# Patient Record
Sex: MALE | Race: WHITE
[De-identification: names, ages, dates, MRNs, and addresses within clinical notes are randomized per-mention and may not be internally consistent; named-entity substitution may affect disease eponyms.]

---

## 2018-07-02 ENCOUNTER — HOSPITAL ENCOUNTER (EMERGENCY)
Dept: HOSPITAL 75 - ER | Age: 73
LOS: 1 days | Discharge: HOME | End: 2018-07-03
Payer: MEDICARE

## 2018-07-02 VITALS — HEIGHT: 70 IN | BODY MASS INDEX: 30.35 KG/M2 | WEIGHT: 212 LBS

## 2018-07-02 DIAGNOSIS — K21.9: ICD-10-CM

## 2018-07-02 DIAGNOSIS — I10: ICD-10-CM

## 2018-07-02 DIAGNOSIS — Z87.891: ICD-10-CM

## 2018-07-02 DIAGNOSIS — Z85.038: ICD-10-CM

## 2018-07-02 DIAGNOSIS — Z79.52: ICD-10-CM

## 2018-07-02 DIAGNOSIS — M54.5: Primary | ICD-10-CM

## 2018-07-02 DIAGNOSIS — Z90.49: ICD-10-CM

## 2018-07-02 PROCEDURE — 72131 CT LUMBAR SPINE W/O DYE: CPT

## 2018-07-02 PROCEDURE — 96372 THER/PROPH/DIAG INJ SC/IM: CPT

## 2018-07-02 NOTE — XMS REPORT
[facility]

 Created on: 10/13/2016



Eddi Sesay

External Reference #: [id number]

: 1945

Sex: Male



Demographics







 Address  [address] [place]  [address]

 

 Home Phone  [phone]

 

 Preferred Language  Unknown

 

 Marital Status  Unknown

 

 Zoroastrian Affiliation  Unknown

 

 Race  White

 

 Ethnic Group  Not  or 





Author







 Author  MALDONADO TOMPKINS

 

 [facility]  eClinicalWorks

 

 Address  Unknown

 

 Phone  Unavailable







Care Team Providers







 Care Team Member Name  Role  Phone

 

 MALDONADO TOMPKINS  CP  Unavailable



                                                                



Allergies

          No Known Allergies                                                   
                                     



Problems

          





 Problem Type  Condition  Code  Onset Dates  Condition Status

 

 Problem  Hypertension  I10     Active

 

 Problem  Hyperlipidemia  E78.5     Active

 

 Problem  Diabetes  E11.9     Active

 

 Problem  Chronic kidney disease  N18.9     Active

 

 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 
lower urinary tract symptoms [LUTS]  600.01     Active

 

 Problem  COPD (chronic obstructive pulmonary disease)  J44.9     Active

 

 Problem  Altered metabolism due to diabetes  E11.9     Active



                                                                               
                                                                     



Medications

          





 Medication  Code System  Code  Instructions  Start Date  End Date  Status  
Dosage

 

 Finasteride  NDC  29991421032  5MG Orally Once a day           1 tablet



                                                                              



Results

          No Known Results                                                     
               



Summary Purpose

          eClinicalWorks Submission

## 2018-07-02 NOTE — XMS REPORT
[facility]

 Created on: 2015



Eddi Sesay

External Reference #: [id number]

: 1945

Sex: Male



Demographics







 Address  [address] [place]  [address]

 

 Home Phone  [phone]

 

 Preferred Language  Unknown

 

 Marital Status  Unknown

 

 Amish Affiliation  Unknown

 

 Race  White

 

 Ethnic Group  Not  or 





Author







 MALDONADO Catalan

 

 Organization  eClinical[employer]

 

 Address  Unknown

 

 Phone  Unavailable







Care Team Providers







 Care Team Member Name  Role  Phone

 

 MALDONADO TOMPKINS  CP  Unavailable



                                                                



Allergies, Adverse Reactions, Alerts

          





 Substance  Reaction  Event Type

 

 Morphine  Info Not Available  Drug Allergy



                                                                               
         



Problems

          





 Problem Type  Condition  ICD-9 Code  Onset Dates  Condition Status

 

 Problem  Other abnormal glucose  790.29     Active

 

 Assessment  Diabetes mellitus without mention of complication, type II or 
unspecified type, not stated as uncontrolled  250.00     Active

 

 Problem  Diabetes mellitus without mention of complication, type II or 
unspecified type, not stated as uncontrolled  250.00     Active

 

 Problem  Chronic airway obstruction, not elsewhere classified  496     Active

 

 Problem  Cervicalgia  723.1     Active

 

 Problem  Shortness of breath  786.05     Active

 

 Problem  Unspecified prostatitis  601.9     Active

 

 Problem  Diabetes mellitus without mention of complication, type II or 
unspecified type, uncontrolled  250.02     Active

 

 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 
lower urinary tract symptoms [LUTS]  600.01     Active



                                                                               
                                                                               
          



Medications

          





 Medication  Code System  Code  Instructions  Start Date  End Date  Status  
Dosage

 

 Metformin HCl  NDC  [address]-21  1000 MG Orally Twice a day           1 
tablet with meals

 

 GlipiZIDE  NDC  [address]-29  10 MG Orally            TAKE ONE TABLET BY 
MOUTH ONCE DAILY

 

 Lisinopril  NDC  92468341641  20MG             TAKE ONE TABLET BY MOUTH ONCE 
DAILY, NEED FOLLOW UP APPT

 

 Finasteride  NDC  [address]-83  5 MG Orally Once a day           1 tablet



                                                                               
                                       



Procedures

          





 Procedure  Coding System  Code  Date

 

 Office Visit, Est Pt., Level 3  CPT-4  95301  Aug 28, 2015

 

 [facility] VISIT ESTABLISHED PATIENT  CPT-4    Aug 28, 2015



                                                                               
                             



Vital Signs

          





 Date/Time:  Aug 28, 2015

 

 Temperature  98.0 F

 

 Weight  209 lbs

 

 Height  70 in

 

 BMI  29.99 Index

 

 Blood Pressure Diastolic  80 mmHg

 

 Blood Pressure Systolic  152 mmHg

 

 Cardiac Monitoring Heart Rate  80 bpm



                                                                              



Results

          No Known Results                                                     
               



Summary Purpose

          eClinicalWorks Submission

## 2018-07-02 NOTE — ED BACK PAIN
General


Chief Complaint:  Back Problems


Stated Complaint:  BACK PAIN


Nursing Triage Note:  


c/o back pain x 4 or 5 days


Nursing Sepsis Screen:  No Definite Risk


Source of Information:  Patient, Family


Exam Limitations:  No Limitations





History of Present Illness


Date Seen by Provider:  Jul 2, 2018


Time Seen by Provider:  23:29


Initial Comments


Patient presents to the ER with his wife with chief complaint for a couple 

weeks now he's had some low back pain progressively getting worse. He does not 

have a history of a lot of back pain and he has not really taken anything but 

some Tylenol for it. He says he does not like taking pills. He would be willing 

to do a muscle relaxant however. He says in the past his primary care doctor 

has suggested he might need an MRI for his back but he has resisted doing any 

imaging. He does have a history of BPH and is on finasteride. He is having no 

problems with dysuria, hesitancy, however he does have some pain wrapping 

around to his groin from his back pain. No history of kidney stones. No 

hematuria. He has had extensive surgery on his belly secondary to a colorectal 

cancer removed with a partial colectomy, colostomy and reanastomosis over 10 

years ago. He's having no problems with bowel movements and had one today 

normal formed. No fevers chills nausea vomiting weakness, incontinence, 

hesitancy of urine, saddle anesthesia, or radiation of his pain.





Allergies and Home Medications


Allergies


Coded Allergies:  


     No Known Drug Allergies (Unverified , 4/14/11)





Home Medications


Prednisone 20 Mg Tab, 40 MG PO DAILY


   Prescribed by: ARLET BAPTISTE on 7/3/18 0031





Patient Home Medication List


Home Medication List Reviewed:  Yes





Constitutional:  No chills, No diaphoresis


EENTM:  No ear pain, No eye pain


Respiratory:  No cough, No short of breath


Cardiovascular:  No chest pain, No Hx of Intervention


Gastrointestinal:  No abdominal pain, No constipation, No diarrhea, No nausea


Genitourinary:  No discharge, No dysuria


Musculoskeletal:  see HPI, back pain


Skin:  No pruritus, No rash





Past Medical-Social-Family Hx


Patient Social History


Alcohol Use:  Denies Use


Recreational Drug Use:  No


Smoking Status:  Former Smoker


Recent Foreign Travel:  No


Contact w/Someone Who Travel:  No


Recent Infectious Disease Expo:  No


Recent Hopitalizations:  No





Past Medical History


Surgeries:  Yes (Colon resection)


Respiratory:  No


Cardiac:  Yes


Hypertension


Neurological:  No


Reproductive Disorders:  No


Genitourinary:  No


Gastrointestinal:  Yes


Gastroesophageal Reflux


Musculoskeletal:  No


Endocrine:  Yes


Diabetes, Non-Insulin dep


Cancer:  Yes


Colon


Psychosocial:  No


Integumentary:  No


Blood Disorders:  No





Physical Exam


Vital Signs





Vital Signs - First Documented








 7/2/18





 23:22


 


Temp 98.2


 


Pulse 80


 


Resp 18


 


B/P (MAP) 180/81 (114)


 


Pulse Ox 98





Capillary Refill : Less Than 3 Seconds


General Appearance:  WD/WN, Mild Distress


HEENT:  PERRL/EOMI, Pharynx Normal


Neck:  Full Range of Motion, Normal Inspection


Cardiovascular:  Regular Rate, Rhythm, No Edema, Normal Peripheral Pulses


Respiratory:  Chest Non Tender, Lungs Clear, No Accessory Muscle Use, No 

Respiratory Distress


Gastrointestinal:  Normal Bowel Sounds, Non Tender, Soft


Back:  Normal Inspection, No CVA Tenderness, Vertebral Tenderness (midline 

lumbar)


Extremity:  Normal Capillary Refill, Non Tender, No Calf Tenderness, No Pedal 

Edema


Neurologic/Psychiatric:  Alert, Oriented x3, No Motor/Sensory Deficits, Normal 

Mood/Affect





Progress/Results/Core Measures


Results/Orders


My Orders





Orders - ARLET BAPTISTE


Ketorolac Injection (Toradol Injection) (7/2/18 23:45)


Ct Lumbar Spine Wo (7/2/18 23:36)


Orphenadrine Injection (Norflex Injectio (7/2/18 23:45)





Medications Given in ED





Current Medications








 Medications  Dose


 Ordered  Sig/Sravani


 Route  Start Time


 Stop Time Status Last Admin


Dose Admin


 


 Orphenadrine


 Citrate  60 mg  ONCE  ONCE


 IM  7/2/18 23:45


 7/2/18 23:47 DC 7/3/18 00:17


60 MG








Vital Signs/I&O











 7/2/18





 23:22


 


Temp 98.2


 


Pulse 80


 


Resp 18


 


B/P (MAP) 180/81 (114)


 


Pulse Ox 98














Blood Pressure Mean:  114











Progress


Progress Note :  


   Time:  23:46


Progress Note


He has declined any kind of pain medicine but he says he would take a muscle 

relaxants or any give him Norflex. We discussed doing imaging now or following 

up with her PCP for MRI and he would prefer to do the CT scan tonight. We also 

discussed steroids and he'll be okay with some prednisone. He has diabetes but 

is not on short acting insulin. We have warned him that could increase his 

blood sugars and we'll put him on 40 mg daily for 5 days.





Diagnostic Imaging





   Diagonstic Imaging:  CT (no contrast)


   Plain Films/CT/US/NM/MRI:  other (lumbar spine)


Comments


No acute osseous abnormality. Mild loss of lumbar curvature. No fracture or 

subluxation. Soft tissues unremarkable.


No acute fracture or subluxation.


   Reviewed:  Reviewed [place] Study, Reviewed by Me





Departure


Impression





 Primary Impression:  


 Lumbago


 Qualified Codes:  M54.5 - Low back pain


Disposition:  01 HOME, SELF-CARE


Condition:  Stable





Departure-Patient Inst.


Decision time for Depature:  00:41


Referrals:  


MALDONADO TOMPKINS MD (PCP/Family)


Primary Care Physician


Patient Instructions:  Low Back Pain  (DC)





Add. Discharge Instructions:  








You can use an ice pack alternated with heating pads. You can use topical 

creams such as icy hot or Biofreeze. You can use Tylenol 1000 mg every 8 hours 

in addition to ibuprofen 800 mg every 8 hours. If you're having back spasms of 

the muscles of your back you can take one tablet of the cyclobenzaprine every 8 

hours as needed. If you still feel drowsy when you wake up then cut the tablet 

in half next time. You can go see a chiropractor. 


 the prednisone and take 2 tablets daily for the next 5 days. Prednisone 

may make your blood sugar rise you may have facial flushing, however and 

feeling of being wired or increased energy.


If you're still having a lot of back pain after a week or 2 you can follow-up 

with her primary care doctor and discussed potentially doing physical therapy. 

If you start to have incontinence of your bowel or bladder, weakness and falls, 

numbness then you should return to the ER for evaluation. 


All discharge instructions reviewed with patient and/or family. Voiced 

understanding.


Scripts


Prednisone (Prednisone) 20 Mg Tab


40 MG PO DAILY for 5 Days, #10 TAB 0 Refills


   Prov: ARLET BAPTISTE         7/3/18





Copy


Copies To 1:   ADÁN AGUDELO TITUS J Jul 2, 2018 23:36

## 2018-07-02 NOTE — XMS REPORT
[facility]

 Created on: 10/15/2015



Eddi Sesay

External Reference #: [id number]

: 1945

Sex: Male



Demographics







 Address  [address] [place]  [address]

 

 Home Phone  [phone]

 

 Preferred Language  Unknown

 

 Marital Status  Unknown

 

 Latter-day Affiliation  Unknown

 

 Race  White

 

 Ethnic Group  Not  or 





Author







 Author  MALDONADO TOMPKINS

 

 [facility]  eClinicalWorks

 

 Address  Unknown

 

 Phone  Unavailable







Care Team Providers







 Care Team Member Name  Role  Phone

 

 MALDONADO TOMPKINS    Unavailable



                                                                



Allergies

          No Known Allergies                                                   
                                     



Problems

          





 Problem Type  Condition  Code  Onset Dates  Condition Status

 

 Problem  Unspecified prostatitis  601.9     Active

 

 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 
lower urinary tract symptoms [LUTS]  600.01     Active

 

 Problem  Shortness of breath  786.05     Active

 

 Assessment  Altered metabolism due to diabetes  E11.9     Active

 

 Problem  Other abnormal glucose  790.29     Active

 

 Problem  Chronic kidney disease  N18.9     Active

 

 Problem  Other and unspecified hyperlipidemia  272.4     Active

 

 Problem  Altered metabolism due to diabetes  E11.9     Active

 

 Problem  Chronic airway obstruction, not elsewhere classified  496     Active

 

 Problem  Diabetes mellitus without mention of complication, type II or 
unspecified type, uncontrolled  250.02     Active

 

 Problem  Cervicalgia  723.1     Active

 

 Problem  Diabetes mellitus without mention of complication, type II or 
unspecified type, not stated as uncontrolled  250.00     Active



                                                                               
                                                                               
                                        



Medications

          





 Medication  Code System  Code  Instructions  Start Date  End Date  Status  
Dosage

 

 Jardiance  NDC  [address]-40  25 MG Orally Once a day  Oct 06, 2015        1 
tablet



                                                                              



Results

          No Known Results                                                     
               



Summary Purpose

          eClinicalWorks Submission

## 2018-07-02 NOTE — XMS REPORT
[facility]

 Created on: 2017



Eddi Sesay

External Reference #: [id number]

: 1945

Sex: Male



Demographics







 Address  [address] [place]  [address]

 

 Preferred Language  Unknown

 

 Marital Status  Unknown

 

 Latter day Affiliation  Unknown

 

 Race  Unknown

 

 Ethnic Group  Unknown





Author







 Author  MALDONADO TOMPKINS

 

 [facility]

 

 Address  [address] [place]  [address]



 

 Phone  [phone]







Care Team Providers







 Care Team Member Name  Role  Phone

 

 MALDONADO TOMPKINS  Unavailable  [phone]







PROBLEMS







 Type  Condition  ICD9-CM Code  CHV16-JB Code  Onset Dates  Condition Status  
SNOMED Code

 

 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 
lower urinary tract symptoms [LUTS]  600.01        Active  218176157

 

 Assessment  Hyperlipidemia     E78.5  28 Sep, 2016  Active  14409051

 

 Problem  Diabetes     E11.9     Active  44945202

 

 Problem  Hypertension     I10     Active  61749045

 

 Problem  Altered metabolism due to diabetes     E11.9     Active  92269601

 

 Problem  Chronic kidney disease     N18.9     Active  325040478

 

 Problem  Hyperlipidemia     E78.5     Active  10250756

 

 Problem  COPD (chronic obstructive pulmonary disease)     J44.9     Active  
20872308







ALLERGIES

Unknown Allergies



SOCIAL HISTORY

No smoking Hx information available



PLAN OF CARE





VITAL SIGNS





MEDICATIONS

Unknown Medications



RESULTS







 Name  Result  Date  Reference Range

 

 LIPID PANEL     2016   

 

 Cholesterol, Total  118     100-199

 

 Triglycerides  142     0-149

 

 HDL Cholesterol  25     >39

 

 VLDL Cholesterol Victor Manuel  28     5-40

 

 LDL Cholesterol Calc  65     0-99

 

 Comment:         

 

 CMP     2016   

 

 Glucose, Serum  180     65-99

 

 BUN  10     8-27

 

 Creatinine, Serum  1.05     0.76-1.27

 

 eGFR If NonAfricn Am  72         >59

 

 eGFR If Africn Am  83         >59

 

 BUN/Creatinine Ratio  10     10-22

 

 Sodium, Serum  139     134-144

 

 Potassium, Serum  4.8     3.5-5.2

 

 Chloride, Serum  97     

 

 Carbon Dioxide, Total  24     18-29

 

 Calcium, Serum  9.7     8.6-10.2

 

 Protein, Total, Serum  6.5     6.0-8.5

 

 Albumin, Serum  4.3     3.6-4.8

 

 Globulin, Total  2.2     1.5-4.5

 

 A/G Ratio  2.0     1.1-2.5

 

 Bilirubin, Total  0.7     0.0-1.2

 

 Alkaline Phosphatase, S  77     

 

 AST (SGOT)  25     0-40

 

 ALT (SGPT)  34     0-44







PROCEDURES







 Procedure  Date Ordered  Related Diagnosis  Body Site

 

 [facility]  [facility]      

 

 VENIPUNCT, ROUTINE*  2016      







IMMUNIZATIONS

No Known Immunizations

## 2018-07-02 NOTE — XMS REPORT
[facility]

 Created on: 10/09/2015



Eddi Sesay

External Reference #: [id number]

: 1945

Sex: Male



Demographics







 Address  [address] [place]  [address]

 

 Home Phone  [phone]

 

 Preferred Language  Unknown

 

 Marital Status  Unknown

 

 Mormonism Affiliation  Unknown

 

 Race  White

 

 Ethnic Group  Not  or 





Author







 Author  MALDONADO TOMPKINS

 

 [facility]  eClinicalWorks

 

 Address  Unknown

 

 Phone  Unavailable







Care Team Providers







 Care Team Member Name  Role  Phone

 

 MALDONADO TOMPKINS  CP  Unavailable



                                                                



Allergies

          No Known Allergies                                                   
                                     



Problems

          





 Problem Type  Condition  Code  Onset Dates  Condition Status

 

 Problem  Unspecified prostatitis  601.9     Active

 

 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 
lower urinary tract symptoms [LUTS]  600.01     Active

 

 Problem  Shortness of breath  786.05     Active

 

 Problem  Other abnormal glucose  790.29     Active

 

 Problem  Chronic kidney disease  N18.9     Active

 

 Problem  Other and unspecified hyperlipidemia  272.4     Active

 

 Problem  Altered metabolism due to diabetes  E11.9     Active

 

 Problem  Chronic airway obstruction, not elsewhere classified  496     Active

 

 Problem  Diabetes mellitus without mention of complication, type II or 
unspecified type, uncontrolled  250.02     Active

 

 Problem  Cervicalgia  723.1     Active

 

 Problem  Diabetes mellitus without mention of complication, type II or 
unspecified type, not stated as uncontrolled  250.00     Active



                                                                               
                                                                               
                              



Medications

          No Known Medications                                                 
                             



Results

          No Known Results                                                     
               



Summary Purpose

          [employer]AB [employer]AG[employer] Submission

## 2018-07-02 NOTE — XMS REPORT
[facility]

 Created on: 10/08/2015



Eddi Sesay

External Reference #: [id number]

: 1945

Sex: Male



Demographics







 Address  [address] [place]  [address]

 

 Home Phone  [phone]

 

 Preferred Language  Unknown

 

 Marital Status  Unknown

 

 Latter day Affiliation  Unknown

 

 Race  White

 

 Ethnic Group  Not  or 





Author







 Author  MALDONADO TOMPKINS

 

 [facility]  eClinicalWorks

 

 Address  Unknown

 

 Phone  Unavailable







Care Team Providers







 Care Team Member Name  Role  Phone

 

 MALDONADO TOMPKINS  CP  Unavailable



                                                                



Allergies

          No Known Allergies                                                   
                                     



Problems

          





 Problem Type  Condition  Code  Onset Dates  Condition Status

 

 Problem  Unspecified prostatitis  601.9     Active

 

 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 
lower urinary tract symptoms [LUTS]  600.01     Active

 

 Problem  Shortness of breath  786.05     Active

 

 Assessment  Chronic kidney disease  N18.9     Active

 

 Problem  Other abnormal glucose  790.29     Active

 

 Problem  Chronic kidney disease  N18.9     Active

 

 Problem  Other and unspecified hyperlipidemia  272.4     Active

 

 Problem  Altered metabolism due to diabetes  E11.9     Active

 

 Problem  Chronic airway obstruction, not elsewhere classified  496     Active

 

 Problem  Diabetes mellitus without mention of complication, type II or 
unspecified type, uncontrolled  250.02     Active

 

 Problem  Cervicalgia  723.1     Active

 

 Problem  Diabetes mellitus without mention of complication, type II or 
unspecified type, not stated as uncontrolled  250.00     Active



                                                                               
                                                                               
                                        



Medications

          No Known Medications                                                 
                             



Results

          No Known Results                                                     
               



Summary Purpose

          eClinicalWorks Submission

## 2018-07-02 NOTE — XMS REPORT
[facility]

 Created on: 2017



Eddi Sesay

External Reference #: [id number]

: 1945

Sex: Male



Demographics







 Address  [address] [place]  [address]

 

 Preferred Language  Unknown

 

 Marital Status  Unknown

 

 Mosque Affiliation  Unknown

 

 Race  Unknown

 

 Ethnic Group  Unknown





Author







 Author  MALDONADO TOMPKINS

 

 [facility]

 

 Address  [address] [place]  [address]



 

 Phone  [phone]







Care Team Providers







 Care Team Member Name  Role  Phone

 

 MALDONADO TOMPKINS  Unavailable  [phone]







PROBLEMS







 Type  Condition  ICD9-CM Code  KCA12-HC Code  Onset Dates  Condition Status  
SNOMED Code

 

 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 
lower urinary tract symptoms [LUTS]  600.01        Active  305839210

 

 Assessment  Diabetes     E11.9  27 Sep, 2016  Active  999566153

 

 Problem  Diabetes     E11.9     Active  51446605

 

 Problem  Hypertension     I10     Active  93700487

 

 Problem  Altered metabolism due to diabetes     E11.9     Active  10149967

 

 Problem  Chronic kidney disease     N18.9     Active  109611716

 

 Problem  Hyperlipidemia     E78.5     Active  65679969

 

 Problem  COPD (chronic obstructive pulmonary disease)     J44.9     Active  
95728495







ALLERGIES







 Substance  Reaction  Event Type  Date  Status

 

 Morphine  Unknown  Drug Allergy  27 Sep, 2016  Active







SOCIAL HISTORY

No smoking Hx information available



PLAN OF CARE





VITAL SIGNS







 Height  70 in  2016

 

 Weight  208.8 lbs  2016

 

 Heart Rate  80 bpm  2016

 

 Respiratory Rate  20   2016

 

 BMI  29.96 kg/m2  2016

 

 Blood pressure systolic  142 mmHg  2016

 

 Blood pressure diastolic  76 mmHg  2016







MEDICATIONS







 Medication  Instructions  Dosage  Frequency  Start Date  End Date  Duration  
Status

 

 Metformin HCl 1000MG  Orally Once a day  TAKE ONE TABLET BY MOUTH TWICE DAILY 
....  MUST  HAVE  FOLLOW  UP  APPT  FOR  FURTHER  REFILLS  24h           Active

 

 Lipitor 10 MG  Orally Once a day  1 tablet  24h  08 Sep, 2015        Active

 

 Trulicity 1.5 MG/0.5ML  Subcutaneous once weekly  0.5 ml     27 Sep, 2016     
   Active

 

 GlipiZIDE 5MG     TAKE ONE TABLET BY MOUTH ONCE DAILY  24h           Active

 

 Lisinopril 20MG     TAKE ONE TABLET BY MOUTH ONCE DAILY, NEED FOLLOW UP APPT  
            Active

 

 ProAir HFA 90 mcg/actuation     inhale 2 puffs by Inhalation route every 4 
hours as needed  PRN shortness of breath/cough     05 May, 2014        Active

 

 Ranitidine Acid Reducer 75 MG  Orally Twice a day  1 tablet as needed  12h    
       Active







RESULTS







 Name  Result  Date  Reference Range

 

 A1C (IN HOUSE)     2016   

 

 A1C IN HOUSE  10.1     4.3 - 5.6 %

 

 Previous A1c  7.7      

 

 Lot   [id number]      

 

 Exp date        







PROCEDURES







 Procedure  Date Ordered  Related Diagnosis  Body Site

 

 GLYCATED HEMOGLOBIN TEST  2016      

 

 [facility] VISIT ESTABLISHED PATIENT  2016      

 

 Office Visit, Est Pt., Level 3  2016      







IMMUNIZATIONS

No Known Immunizations

## 2018-07-02 NOTE — XMS REPORT
[facility]

 Created on: 2015



Eddi Sesay

External Reference #: [id number]

: 1945

Sex: Male



Demographics







 Address  [address] [place]  [address]

 

 Home Phone  [phone]

 

 Preferred Language  Unknown

 

 Marital Status  Unknown

 

 Confucianist Affiliation  Unknown

 

 Race  White

 

 Ethnic Group  Not  or 





Author







 Author  MALDONADO TOMPKINS

 

 [facility]  [employer]

 

 Address  Unknown

 

 Phone  Unavailable







Care Team Providers







 Care Team Member Name  Role  Phone

 

 MALDONADO TOMPKINS  CP  Unavailable



                                                                



Allergies

          No Known Allergies                                                   
                                     



Problems

          





 Problem Type  Condition  ICD-9 Code  Onset Dates  Condition Status

 

 Assessment  Other and unspecified hyperlipidemia  272.4     Active

 

 Problem  Unspecified prostatitis  601.9     Active

 

 Problem  Other abnormal glucose  790.29     Active

 

 Problem  Cervicalgia  723.1     Active

 

 Problem  Diabetes mellitus without mention of complication, type II or 
unspecified type, not stated as uncontrolled  250.00     Active

 

 Problem  Other and unspecified hyperlipidemia  272.4     Active

 

 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 
lower urinary tract symptoms [LUTS]  600.01     Active

 

 Problem  Shortness of breath  786.05     Active

 

 Problem  Chronic airway obstruction, not elsewhere classified  496     Active

 

 Problem  Diabetes mellitus without mention of complication, type II or 
unspecified type, uncontrolled  250.02     Active



                                                                               
                                                                               
                    



Medications

          





 Medication  Code System  Code  Instructions  Start Date  End Date  Status  
Dosage

 

 Lipitor  NDC  [address]-38  10 MG Orally Once a day  2015        1 
tablet



                                                                              



Results

          No Known Results                                                     
               



Summary Purpose

          [employer] Submission

## 2018-07-02 NOTE — XMS REPORT
[facility]

 Created on: 2018



Eddi Sesay

External Reference #: [id number]

: 1945

Sex: Male



Demographics







 Address  [address]  [address]

 

 Preferred Language  Unknown

 

 Marital Status  Unknown

 

 Zoroastrian Affiliation  Unknown

 

 Race  Unknown

 

 Ethnic Group  Unknown





Author







 Author  MALDONADO TOMPKINS

 

 [facility]

 

 Address  [address] [place]  [address]



 

 Phone  [phone]







Care Team Providers







 Care Team Member Name  Role  Phone

 

 MALDONADO TOMPKINS  Unavailable  [phone]







PROBLEMS







 Type  Condition  ICD9-CM Code  QQW80-ZI Code  Onset Dates  Condition Status  
SNOMED Code

 

 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 
lower urinary tract symptoms [LUTS]  600.01        Active  164455757

 

 Problem  Hyperlipidemia     E78.5     Active  86366468

 

 Problem  Chronic kidney disease     N18.9     Active  934705469

 

 Problem  Type 2 diabetes mellitus with diabetic chronic kidney disease     
E11.22     Active  82173748

 

 Problem  Chronic kidney disease, stage II (mild)     N18.2     Active  
822329132

 

 Problem  Diabetes     E11.9     Active  66604408

 

 Problem  COPD (chronic obstructive pulmonary disease)     J44.9     Active  
18314593

 

 Problem  BPH NOS w ur obs/LUTS     N40.1     Active  692393975

 

 Problem  Hypertension     I10     Active  83305691







ALLERGIES

No Information



ENCOUNTERS







 Encounter  Location  Date  Diagnosis

 

 [facility]  [address] [place] [address]-
0490  20 Mar, 2018  BPH NOS w ur obs/LUTS N40.1 ; COPD (chronic obstructive 
pulmonary disease) J44.9 ; Hyperlipidemia E78.5 ; Hypertension I10 ; Type 2 
diabetes mellitus with diabetic chronic kidney disease E11.22 and Chronic 
kidney disease, stage II (mild) N18.2

 

 [facility]  [address], [place] [address]  19 Mar, 2018  Diabetes E11.9

 

 [facility]  [id number] [place] [address]-
7508  15 Aug, 2017  COPD (chronic obstructive pulmonary disease) J44.9

 

 [facility]  [address] [place] [address]-
4639  01 Aug, 2017  Hyperlipidemia E78.5

 

 [facility]  [address] [address]
5[address]     

 

 [facility]  [address] [address]     

 

 [facility]  [address] [address]    Diabetes E11.9

 

 [facility]  [address] [address]
7921    Diabetes E11.9

 

 [facility]  [address] [address]
7943    Hypertension I10 ; Diabetes E11.9 and COPD (chronic 
obstructive pulmonary disease) J44.9

 

 [facility]  [address] [address]    Diabetes E11.9 and Hypertension I10

 

 [facility]  [address] [address]  13 Oct, 2016   

 

 [facility]  [address] [address]
7524  28 Sep, 2016  Hyperlipidemia E78.5

 

 [facility]  [address] [address]13  27 Sep, 2016  Diabetes E11.9 and Hyperlipidemia E78.5

 

 [facility]  [address] [address]-
7546    Chronic kidney disease N18.9

 

 [facility]  [id number] [address]-
2615    Diabetes E11.9 ; Hypertension I10 ; Hyperlipidemia E78.5 
and COPD (chronic obstructive pulmonary disease) J44.9

 

 [facility]  [address] [address]-
6674  06 Oct, 2015   

 

 [facility]  [address] [address]-
[address]759  06 Oct, 2015  Altered metabolism due to diabetes E11.9

 

 [address] [address]-
3446  06 Oct, 2015  Chronic kidney disease N18.9

 

 [place] [id number] [place] [id number]
1248  05 Oct, 2015  Chronic kidney disease N18.9 and Back pain M54.9

 

 [place] [id number] [place] [address]-
1750  24 Sep, 2015  Abdominal pain 789.00

 

 [place] [id number] [place] [address]-
8830  08 Sep, 2015  Other and unspecified hyperlipidemia 272.4

 

 [place] [id number] [place] [address]-
2691  03 Sep, 2015  Diabetes mellitus without mention of complication, type II 
or unspecified type, not stated as uncontrolled 250.00

 

 [place] [id number] [place] [address]-
5782  28 Aug, 2015  Diabetes mellitus without mention of complication, type II 
or unspecified type, not stated as uncontrolled 250.00

 

 [place] [id number] [place] [address]-
1715    Diabetes mellitus without mention of complication, type II 
or unspecified type, uncontrolled 250.02 ; Diabetes mellitus without mention of 
complication, type II or unspecified type, not stated as uncontrolled 250.00 
and Acute sinusitis 461.9

 

 [place] [address] [place] [address]-
2322     

 

 [place] [address] [place] [address]-
0766     

 

 [place] [id number] [place] [address]-
9453     

 

 [address] [place] [address] [place] [id number]-
5476     

 

 [id number] [place] [address] [place] [address]-
5634  12 Dec, 2014   

 

 [facility] [place] [facility]  [address] [id number] [place]
9573  12 Dec, 2014   

 

 [facility] [place] [facility]  [address] [id number] [place] [address]-
3130  08 Dec, 2014   

 

 [facility] [place] [facility]  [address] [id number] [place] [address]-
6402  08 Dec, 2014   

 

 [facility] [place] [facility]  [address] [id number] [place] [address]-
1350  08 Dec, 2014   

 

 [facility] [place] [facility]  [address] [id number] [place] [address]
4832  08 Dec, 2014   

 

 [facility] [place] [facility]  [address] [id number] [place] [address]-
3983     

 

 [facility] [place] [facility]  [address] [id number] [place] [address]-
1822     

 

 [facility] [place] [facility]  [address] [id number] [place] [address]-
1658  10 Oct, 2014   

 

 [facility] [place] [facility]  [address] [id number] [place] [address]-
6375  10 Oct, 2014   

 

 [facility] [place] [facility]  [address] [id number] [place] [address]-
5617  07 Oct, 2014   

 

 [facility] [place] [facility]  [address] [id number] [place] [address]-
4481  07 Oct, 2014   

 

 [facility] [place] [facility]  [address] [id number] [address]-
0316  04 Aug, 2014   

 

 [facility] [place] [facility]  [address] [id number] [place] [address]-
5023  04 Aug, 2014   

 

 [facility] [place] [facility]  [address] [id number] [place] [address]-
7034  05 May, 2014   

 

 [facility] [place] [facility]  [address] [id number] [place] [address]-
7488  05 May, 2014   

 

 [facility] [place] [facility]  [address] [id number] [place] [address]-
4401  08 2014   

 

 [facility] [place] [facility]  [address] [id number] [place] [address]-
8305     

 

 [facility] [place] [facility]  [address] [id number] [place] [address]-
7716     

 

 [facility] [place] [facility]  [address] [id number] [place] [address]-
8613     

 

 [facility] [place] [facility]  [address] [id number] [place] [address]-
0251     

 

 [facility] [place] [facility]  [address] [id number] [place] [address]-
8737     

 

 [facility] [place] [facility]  [address] [id number] [place] [address]-
0293     

 

 [facility] [place] [facility]  [address] [id number] [place] [address]-
5720     

 

 [facility] [place] [facility]  [address] [id number] [place] [address]-
9629     

 

 [facility] [place] [facility]  [address] [id number] [place] [address]-
3100     

 

 [facility] [place] [facility]  [address] [id number] [place] [address]-
8882  31 Mar, 2014   

 

 [facility] [place] [facility]  [address] [id number] [place] [address]-
3880  31 Mar, 2014   

 

 [facility] [place] [facility]  [address] [id number] [place] [address]-
5904  05 Mar, 2014   

 

 [facility] [place] [facility]  [address] [id number] [place] [address]-
5007  05 Mar, 2014   

 

 [facility] [place] [facility]  [address] [id number] [place] [address]-
7555     

 

 [facility] [place] [facility]  [address] [id number] [place] [address]-
0369     

 

 [facility] [place] [facility]  [address] [id number] [place] [address]-
9129     

 

 [facility] [place] [facility]  [address] [id number] [place] [address]-
8143     

 

 [facility] [place] [facility]  [address] [id number] [place] [address]-
8535     

 

 [facility] [place] [facility]  [address] [id number] [place] [address]-
5938     

 

 [facility] [place] [facility]  [address] [id number] [place] [address]-
7758     

 

 [facility] [place] [facility]  [address] [id number] [place] [address]-
2290     

 

 [facility] [place] [facility]  [address] [id number] [place] [address]-
3811  13 Aug, 2013   

 

 [facility] [place] [facility]  [address] [id number] [place] [address]-
7520     

 

 [facility] [place] [facility]  [address] [id number] [place] [address]-
1742     

 

 [facility] [place] [facility]  [address] [id number] [place] [address]-
7241  15 Jul, 2013   

 

 [facility] [place] [facility]  [address] [id number] [place] [address]-
0415     

 

 [facility] [place] [facility]  [address] [id number] [place] [address]-
6175     

 

 [facility] [place] [facility]  [address] [id number] [place] [address]-
5723  07 Mar, 2013   

 

 [facility] [place] [facility]  [address] [id number] [place] [address]-
4128     

 

 [facility] [place] [facility]  [address] [id number] [place] [address]-
1471     

 

 [facility] [place] [facility]  [address], [place] [address]-
9016     

 

 [facility]  [address], [place] [address]-
6096  15 Khurram, 2013   

 

 [facility]  [address] [address]-
9916     

 

 [facility]  [address] [address]-
1716     

 

 [facility]  [address] [address]-
2377     

 

 [facility]  [address] [address]-
2647     

 

 [facility]  [address] [address]-
5826  13 Sep, 2012   

 

 [facility]  [address] [place] [address]-
7324  22 May, 2012   

 

 [facility]  [address], [place] [address]-
4995     

 

 [facility]  [address], [place] [address]-
2331     

 

 [facility]  [address], [place] [address]-
4936     

 

 [facility]  [address], [place] [address]-
1711     

 

 [facility]  [address], [place] [address]-
8730     

 

 [facility]  [address] [address]-
7695     







IMMUNIZATIONS

No Known Immunizations



SOCIAL HISTORY

Never Assessed



REASON FOR VISIT

Refill request



PLAN OF CARE





VITAL SIGNS





MEDICATIONS







 Medication  Instructions  Dosage  Frequency  Start Date  End Date  Duration  
Status

 

 Finasteride 5MG  Orally Once a day  1 tablet  24h        90  Active







RESULTS

No Results



PROCEDURES

No Known procedures



INSTRUCTIONS





MEDICATIONS ADMINISTERED

No Known Medications



MEDICAL (GENERAL) HISTORY







 Type  Description  Date

 

 Medical History  Type II diabetic   

 

 Medical History  hypertension   

 

 Surgical History  colon surgery   

 

 Surgical History  scar tissue removal   

 

 Surgical History  bilateral cataract surgeries   

 

 Hospitalization History  surgery

## 2018-07-02 NOTE — XMS REPORT
[facility]

 Created on: 2018



Eddi Sesay

External Reference #: [id number]

: 1945

Sex: Male



Demographics







 Address  [address]  [address]

 

 Preferred Language  Unknown

 

 Marital Status  Unknown

 

 Adventism Affiliation  Unknown

 

 Race  Unknown

 

 Ethnic Group  Unknown





Author







 Author  MALDONADO TOMPKINS

 

 [facility]

 

 Address  [address] [place]  [address]



 

 Phone  [phone]







Care Team Providers







 Care Team Member Name  Role  Phone

 

 MALDONADO TOMPKINS  Unavailable  [phone]







PROBLEMS







 Type  Condition  ICD9-CM Code  VWU68-LX Code  Onset Dates  Condition Status  
SNOMED Code

 

 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 
lower urinary tract symptoms [LUTS]  600.01        Active  424914263

 

 Problem  Hyperlipidemia     E78.5     Active  85335458

 

 Problem  Chronic kidney disease     N18.9     Active  037059818

 

 Problem  Type 2 diabetes mellitus with diabetic chronic kidney disease     
E11.22     Active  18239061

 

 Problem  Chronic kidney disease, stage II (mild)     N18.2     Active  
885219399

 

 Problem  Diabetes     E11.9     Active  99579183

 

 Problem  COPD (chronic obstructive pulmonary disease)     J44.9     Active  
41245557

 

 Problem  BPH NOS w ur obs/LUTS     N40.1     Active  015426409

 

 Problem  Hypertension     I10     Active  43711890







ALLERGIES

No Information



ENCOUNTERS







 Encounter  Location  Date  Diagnosis

 

 [facility]  [address] [place] [address]-
9671  20 Mar, 2018  BPH NOS w ur obs/LUTS N40.1 ; COPD (chronic obstructive 
pulmonary disease) J44.9 ; Hyperlipidemia E78.5 ; Hypertension I10 ; Type 2 
diabetes mellitus with diabetic chronic kidney disease E11.22 and Chronic 
kidney disease, stage II (mild) N18.2

 

 [facility]  [address] [address]86  19 Mar, 2018  Diabetes E11.9

 

 [facility]  [id number] [place] [address]-
1231  15 Aug, 2017  COPD (chronic obstructive pulmonary disease) J44.9

 

 [facility]  [address] [place] [address]-
0764  01 Aug, 2017  Hyperlipidemia E78.5

 

 [facility]  [address] [address]
5272     

 

 [facility]  [address] [address]
4762     

 

 [facility]  [address] [address]
4313    Diabetes E11.9

 

 [facility]  [address] [address]
1416    Diabetes E11.9

 

 [facility]  [address] [address]
2450    Hypertension I10 ; Diabetes E11.9 and COPD (chronic 
obstructive pulmonary disease) J44.9

 

 [facility]  [address] [address]
6744    Diabetes E11.9 and Hypertension I10

 

 [facility]  [address] [address]
5979  13 Oct, 2016   

 

 [facility]  [address] [address]
0296  28 Sep, 2016  Hyperlipidemia E78.5

 

 [facility]  [address] [address]
5171  27 Sep, 2016  Diabetes E11.9 and Hyperlipidemia E78.5

 

 [facility]  [address] [address]-
5001    Chronic kidney disease N18.9

 

 [facility]  [id number] [address]-
0105    Diabetes E11.9 ; Hypertension I10 ; Hyperlipidemia E78.5 
and COPD (chronic obstructive pulmonary disease) J44.9

 

 [facility]  [address] [address]-
9162  06 Oct, 2015   

 

 [facility]  [address] [address]-
0179  06 Oct, 2015  Altered metabolism due to diabetes E11.9

 

 [place] [address] [address]-
2774  06 Oct, 2015  Chronic kidney disease N18.9

 

 [place] [id number] [place] [address] [id number]
[phone]  05 Oct, 2015  Chronic kidney disease N18.9 and Back pain M54.9

 

 [place] [id number] [place] [address]-
6866  24 Sep, 2015  Abdominal pain 789.00

 

 [place] [id number] [place] [address]-
3909  08 Sep, 2015  Other and unspecified hyperlipidemia 272.4

 

 [place] [id number] [place] [address]-
3024  03 Sep, 2015  Diabetes mellitus without mention of complication, type II 
or unspecified type, not stated as uncontrolled 250.00

 

 [place] [id number] [place] [address]-
7542  28 Aug, 2015  Diabetes mellitus without mention of complication, type II 
or unspecified type, not stated as uncontrolled 250.00

 

 [place] [id number] [place] [address]-
5085    Diabetes mellitus without mention of complication, type II 
or unspecified type, uncontrolled 250.02 ; Diabetes mellitus without mention of 
complication, type II or unspecified type, not stated as uncontrolled 250.00 
and Acute sinusitis 461.9

 

 [place] [address] [place] [address]-
2337     

 

 [place] [address] [place] [address]-
4632     

 

 [place] [id number] [place] [address]-
1402     

 

 [place] [address] [place] [address] [id number]
1474     

 

 [place] [id number] [place] [address] [place] [address]-
1002  12 Dec, 2014   

 

 [facility] [place] [facility]  [address] [id number] [place]
6004  12 Dec, 2014   

 

 [facility] [place] [facility]  [address] [id number] [place]
9985  08 Dec, 2014   

 

 [facility] [place] [facility]  [address] [id number] [place] [address]-
3683  08 Dec, 2014   

 

 [facility] [place] [facility]  [address] [id number] [place] [address]-
4599  08 Dec, 2014   

 

 [facility] [place] [facility]  [address] [id number] [place]
1043  08 Dec, 2014   

 

 [facility] [place] [facility]  [address] [id number] [place] [address]-
8051     

 

 [facility] [place] [facility]  [address] [id number] [place] [address]-
1562     

 

 [facility] [place] [facility]  [address] [id number] [place] [address]-
2999  10 Oct, 2014   

 

 [facility] [place] [facility]  [address] [id number] [place] [address]-
4179  10 Oct, 2014   

 

 [facility] [place] [facility]  [address] [id number] [place] [address]-
4343  07 Oct, 2014   

 

 [facility] [place] [facility]  [address] [id number] [place] [address]-
9068  07 Oct, 2014   

 

 [facility] [place] [facility]  [address] [id number] [address]-
3315  04 Aug, 2014   

 

 [facility] [place] [facility]  [address] [id number] [place] [address]-
8984  04 Aug, 2014   

 

 [facility] [place] [facility]  [address] [id number] [place] [address]-
8629  05 May, 2014   

 

 [facility] [place] [facility]  [address] [id number] [place] [address]-
1792  05 May, 2014   

 

 [facility] [place] [facility]  [address] [id number] [place] [address]-
9946  08 2014   

 

 [facility] [place] [facility]  [address] [id number] [place] [address]-
3026     

 

 [facility] [place] [facility]  [address] [id number] [place] [address]-
3326     

 

 [facility] [place] [facility]  [address] [id number] [place] [address]-
0807     

 

 [facility] [place] [facility]  [address] [id number] [place] [address]-
0471     

 

 [facility] [place] [facility]  [address] [id number] [place] [address]-
3930     

 

 [facility] [place] [facility]  [address] [id number] [place] [address]-
8913     

 

 [facility] [place] [facility]  [address] [id number] [place] [address]-
3226     

 

 [facility] [place] [facility]  [address] [id number] [place] [address]-
6562     

 

 [facility] [place] [facility]  [address] [id number] [place] [address]-
0838     

 

 [facility] [place] [facility]  [address] [id number] [place] [address]-
2070  31 Mar, 2014   

 

 [facility] [place] [facility]  [address] [id number] [place] [address]-
5025  31 Mar, 2014   

 

 [facility] [place] [facility]  [address] [id number] [place] [address]-
7916  05 Mar, 2014   

 

 [facility] [place] [facility]  [address] [id number] [place] [address]-
1243  05 Mar, 2014   

 

 [facility] [place] [facility]  [address] [id number] [place] [address]-
2268     

 

 [facility] [place] [facility]  [address] [id number] [place] [address]-
8256     

 

 [facility] [place] [facility]  [address] [id number] [place] [address]-
8476     

 

 [facility] [place] [facility]  [address] [id number] [place] [address]-
4352     

 

 [facility] [place] [facility]  [address] [id number] [place] [address]-
3066     

 

 [facility] [place] [facility]  [address] [id number] [place] [address]-
9328     

 

 [facility] [place] [facility]  [address] [id number] [place] [address]-
5943     

 

 [facility] [place] [facility]  [address] [id number] [place] [address]-
4150     

 

 [facility] [place] [facility]  [address] [id number] [place] [address]-
0156  13 Aug, 2013   

 

 [facility] [place] [facility]  [address] [id number] [place] [address]-
7787     

 

 [facility] [place] [facility]  [address] [id number] [place] [address]-
6677     

 

 [facility] [place] [facility]  [address] [id number] [place] [address]-
6708  15 Jul, 2013   

 

 [facility] [place] [facility]  [address] [id number] [place] [address]-
4110     

 

 [facility] [place] [facility]  [address] [id number] [place] [address]-
8033     

 

 [facility] [place] [facility]  [address] [id number] [place] [address]-
0467  07 Mar, 2013   

 

 [facility] [place] [facility]  [address] [id number] [place] [address]-
5554     

 

 [facility] [place] [facility]  [address] [id number] [place] [address]-
0245     

 

 [facility] [place] [facility]  [address] [address]-
4627     

 

 [facility]  [address], [place] [address]-
1208  15 Khurram, 2013   

 

 [facility]  [address] [address]-
3787     

 

 [facility]  [address] [address]-
3935     

 

 [facility]  [address], [place] [address]-
0893     

 

 [facility]  [address], [place] [address]-
0184     

 

 [facility]  [address], [place] [address]-
8760  13 Sep, 2012   

 

 [facility]  [address], [place] [address]-
3251  22 May, 2012   

 

 [facility]  [address], [place] [address]-
9842     

 

 [facility]  [address], [place] [address]-
4119     

 

 [facility]  [address], [place] [address]-
2056     

 

 [facility]  [address], [place] [address]-
3067     

 

 [facility]  [id number], [place] [address]-
9596     

 

 [facility]  [address] [address]-
0873     







IMMUNIZATIONS

No Known Immunizations



SOCIAL HISTORY

Never Assessed



REASON FOR VISIT

Refill request



PLAN OF CARE





VITAL SIGNS





MEDICATIONS

No Known Medications



RESULTS

No Results



PROCEDURES

No Known procedures



INSTRUCTIONS





MEDICATIONS ADMINISTERED

No Known Medications



MEDICAL (GENERAL) HISTORY







 Type  Description  Date

 

 Medical History  Type II diabetic   

 

 Medical History  hypertension   

 

 Surgical History  colon surgery   

 

 Surgical History  scar tissue removal   

 

 Surgical History  bilateral cataract surgeries   

 

 Hospitalization History  surgery

## 2018-07-02 NOTE — XMS REPORT
[facility]

 Created on: 2015



Eddi Sesay

External Reference #: [id number]

: 1945

Sex: Male



Demographics







 Address  [address] [place]  [address]

 

 Home Phone  [phone]

 

 Preferred Language  Unknown

 

 Marital Status  Unknown

 

 Pentecostalism Affiliation  Unknown

 

 Race  White

 

 Ethnic Group  Not  or 





Author







 MALDONADO Catalan

 

 [facility]  [employer]

 

 Address  Unknown

 

 Phone  Unavailable







Care Team Providers







 Care Team Member Name  Role  Phone

 

 MALDONADO TOMPKINS  CP  Unavailable



                                                                



Allergies

          No Known Allergies                                                   
                                     



Problems

          





 Problem Type  Condition  ICD-9 Code  Onset Dates  Condition Status

 

 Problem  Other abnormal glucose  790.29     Active

 

 Assessment  Diabetes mellitus without mention of complication, type II or 
unspecified type, not stated as uncontrolled  250.00     Active

 

 Problem  Diabetes mellitus without mention of complication, type II or 
unspecified type, not stated as uncontrolled  250.00     Active

 

 Problem  Chronic airway obstruction, not elsewhere classified  496     Active

 

 Problem  Cervicalgia  723.1     Active

 

 Problem  Shortness of breath  786.05     Active

 

 Problem  Unspecified prostatitis  601.9     Active

 

 Problem  Diabetes mellitus without mention of complication, type II or 
unspecified type, uncontrolled  250.02     Active

 

 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 
lower urinary tract symptoms [LUTS]  600.01     Active



                                                                               
                                                                               
          



Medications

          No Known Medications                                                 
                                       



Procedures

          





 Procedure  Coding System  Code  Date

 

 VENIPUNCT, ROUTINE*  CPT-4  67757  2015

 

 LAB NOT BILLED BY [facility]  CPT-4  NOBLL  2015



                                                                              



Results

          





 Name  Result  Date  Reference Range  Unit  Abnormality Flag

 

 ROUTINE VENIPUNCTURE               



                                                                    



Summary Purpose

          eClinical[employer] Submission

## 2018-07-03 VITALS — DIASTOLIC BLOOD PRESSURE: 81 MMHG | SYSTOLIC BLOOD PRESSURE: 180 MMHG

## 2018-07-03 NOTE — DIAGNOSTIC IMAGING REPORT
PROCEDURE: CT lumbar spine without contrast.



TECHNIQUE: Multiple contiguous axial images were obtained through

the lumbar spine without the use of intravenous contrast.

Sagittal and coronal reformations were then performed.



INDICATION:  Lower back pain x5 days.



COMPARISON: CT abdomen dated 06/12/2011



FINDINGS: Static alignment of the lumbar spine shows slight grade

1 retrolisthesis at the L2-L3 level. Otherwise, static alignment

is maintained. There is no evidence of jumped facets.



Vertebral body heights are preserved. There is no evidence of

acute fracture. No bony fragments are seen within the spinal

canal. There are moderate multilevel degenerative changes

consisting of intervertebral disc height loss with mild

multilevel disc bulges and multilevel facet arthropathy.

Evaluation of the spinal canal is suboptimally evaluated on this

routine noncontrast CT lumbar spine, but there does appear to be

perhaps mild spinal canal narrowing at the L2-L3 through L4-L5

levels.



Evaluation of the surrounding pre-and paravertebral soft tissue

structures demonstrates partially visualized prominent soft

tissue density interposed between the IVC and abdominal aorta at

the level of the L2 and L3 intervertebral disc space measuring

3.1 x 5.1 cm. This does obscure borders of the IVC. A few other

prominent appearing retroperitoneal lymph nodes are also noted

more superiorly.



IMPRESSION:

1. No CT evidence of acute fracture or dislocation of the lumbar

spine.

2. Findings suspicious for prominent retroperitoneal adenopathy

as described above. Correlation with postcontrast CT abdomen and

pelvis is recommended. These findings were not discussed on

[facility] report. Additional findings will be called.



Called to Dr. Alejandre at 7:40 a.m. by cvb.



Dictated by: 



  Dictated on workstation # [id number]

## 2018-07-18 ENCOUNTER — HOSPITAL ENCOUNTER (OUTPATIENT)
Dept: HOSPITAL 75 - RAD | Age: 73
End: 2018-07-18
Attending: INTERNAL MEDICINE
Payer: MEDICARE

## 2018-07-18 DIAGNOSIS — Z85.038: ICD-10-CM

## 2018-07-18 DIAGNOSIS — R59.0: ICD-10-CM

## 2018-07-18 DIAGNOSIS — R16.0: ICD-10-CM

## 2018-07-18 DIAGNOSIS — N18.2: ICD-10-CM

## 2018-07-18 DIAGNOSIS — K80.20: Primary | ICD-10-CM

## 2018-07-18 LAB
CREAT SERPL-MCNC: 1.44 MG/DL (ref 0.6–1.3)
GFR SERPLBLD BASED ON 1.73 SQ M-ARVRAT: 48 ML/MIN

## 2018-07-18 PROCEDURE — 82565 ASSAY OF CREATININE: CPT

## 2018-07-18 PROCEDURE — 36415 COLL VENOUS BLD VENIPUNCTURE: CPT

## 2018-07-18 PROCEDURE — 74177 CT ABD & PELVIS W/CONTRAST: CPT

## 2018-07-18 PROCEDURE — 84520 ASSAY OF UREA NITROGEN: CPT

## 2018-07-18 NOTE — DIAGNOSTIC IMAGING REPORT
PROCEDURE: CT abdomen and pelvis with contrast.



TECHNIQUE: Multiple contiguous axial images were obtained through

the abdomen and pelvis after administration of intravenous

contrast. 



INDICATION:  Low back pain radiating into the front. Patient has

history of colon carcinoma.



Comparison is made with prior CT from 06/12/2011.



FINDINGS: The lung bases are clear. There is an ill-defined

low-density mass in the right lobe of the liver measuring 19 mm

in size. No other liver masses are seen. Multiple stones within

the gallbladder are noted. No biliary duct dilatation is seen.

The pancreas and spleen are unremarkable apart from multiple

granulomas in the spleen. No adrenal mass is detected. Kidneys

are unremarkable apart from a small cyst lower pole right kidney.

Aorta is calcified but nonaneurysmal. There has been significant

increase in central retroperitoneal and portacaval

lymphadenopathy. Portacaval node measures 4.9 x 2.5 cm compared

with 3.7 x 1.0 cm. Some bulky lymph nodes in the central

retroperitoneum. Aortic caval node at the level of the kidneys

measures 5.4 x 3.6 cm compare with 1.6 x 1.1 cm. Multiple

additional slightly prominent lymph nodes in the central

retroperitoneum are noted as well. Bowel loops are nondilated,

however, there is suggestion of a circumferential thickening and

questionable mass in the ascending colon near the hepatic

flexure. Small bowel loops are normal caliber. No obstruction is

seen. There is no ascites. Bladder is unremarkable. No definite

pelvic lymphadenopathy is seen.



IMPRESSION:

1. Cholelithiasis.

2. 19 mm low-density mass right lobe of the liver.

3. Significant increase in portacaval and central retroperitoneal

lymphadenopathy when compared with study from 2011. Features are

concerning for a metastatic disease. There is also a questionable

mass in the ascending colon, as described correlation with

endoscopy would be recommended. No other significant

abnormalities are seen.



Dictated by: 



  Dictated on workstation # [id number]

## 2018-07-24 ENCOUNTER — HOSPITAL ENCOUNTER (OUTPATIENT)
Dept: HOSPITAL 75 - ENDO | Age: 73
Discharge: HOME | End: 2018-07-24
Attending: SURGERY
Payer: MEDICARE

## 2018-07-24 VITALS — SYSTOLIC BLOOD PRESSURE: 154 MMHG | DIASTOLIC BLOOD PRESSURE: 78 MMHG

## 2018-07-24 VITALS — BODY MASS INDEX: 30.42 KG/M2 | WEIGHT: 212.5 LBS | HEIGHT: 70 IN

## 2018-07-24 VITALS — SYSTOLIC BLOOD PRESSURE: 148 MMHG | DIASTOLIC BLOOD PRESSURE: 75 MMHG

## 2018-07-24 VITALS — SYSTOLIC BLOOD PRESSURE: 131 MMHG | DIASTOLIC BLOOD PRESSURE: 74 MMHG

## 2018-07-24 DIAGNOSIS — E11.42: ICD-10-CM

## 2018-07-24 DIAGNOSIS — D12.2: Primary | ICD-10-CM

## 2018-07-24 DIAGNOSIS — Z87.891: ICD-10-CM

## 2018-07-24 DIAGNOSIS — Z85.038: ICD-10-CM

## 2018-07-24 DIAGNOSIS — Z79.84: ICD-10-CM

## 2018-07-24 DIAGNOSIS — J44.9: ICD-10-CM

## 2018-07-24 DIAGNOSIS — I10: ICD-10-CM

## 2018-07-24 NOTE — XMS REPORT
[facility]

 Created on: 09/15/2017



Shama Eddi

External Reference #: [id number]

: 1945

Sex: Male



Demographics







 Address  [address] [place]  [address]

 

 Preferred Language  Unknown

 

 Marital Status  Unknown

 

 Restorationism Affiliation  Unknown

 

 Race  Unknown

 

 Ethnic Group  Unknown





Author







 Author  MALDONADO TOMPKINS

 

 Organization  [facility]

 

 Address  [address] [place]  [address]



 

 Phone  [phone]







Care Team Providers







 Care Team Member Name  Role  Phone

 

 MALDONADO TOMPKINS  Unavailable  [phone]







PROBLEMS







 Type  Condition  ICD9-CM Code  BXW51-HI Code  Onset Dates  Condition Status  
SNOMED Code

 

 Problem  Hypertension     I10     Active  53506814

 

 Problem  Diabetes     E11.9     Active  05696979

 

 Problem  Chronic kidney disease     N18.9     Active  877181441

 

 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 
lower urinary tract symptoms [LUTS]  600.01        Active  241438116

 

 Problem  COPD (chronic obstructive pulmonary disease)     J44.9     Active  
59078126

 

 Problem  Hyperlipidemia     E78.5     Active  71362564







ALLERGIES







 Substance  Reaction  Event Type  Date  Status

 

 Morphine  Unknown  Drug Allergy    Active







SOCIAL HISTORY

No smoking Hx information available



PLAN OF CARE







 Activity  Details









  









 Follow Up  3 Months Reason:







VITAL SIGNS







 Height  70 in  2017

 

 Weight  205.3 lbs  2017

 

 Temperature  97.8 degrees Fahrenheit  2017

 

 Heart Rate  80 bpm  2017

 

 Respiratory Rate  24   2017

 

 BMI  29.45 kg/m2  2017

 

 Blood pressure systolic  162 mmHg  2017

 

 Blood pressure diastolic  82 mmHg  2017







MEDICATIONS







 Medication  Instructions  Dosage  Frequency  Start Date  End Date  Duration  
Status

 

 Finasteride 5MG  Orally Once a day  1 tablet  24h           Active

 

 Lipitor 10 MG  Orally Once a day  1 tablet  24h  08 Sep, 2015        Active

 

 Trulicity 1.5 MG/0.5ML  Subcutaneous once weekly  0.5 ml     27 Sep, 2016     
   Active

 

 Lisinopril 20MG     TAKE ONE TABLET BY MOUTH ONCE DAILY, NEED FOLLOW UP APPT  
            Active

 

 GlipiZIDE 5MG     TAKE ONE TABLET BY MOUTH ONCE DAILY  24h           Active

 

 Metformin HCl 1000MG     TAKE ONE TABLET BY MOUTH TWICE DAILY           45  
Active

 

 Hydrochlorothiazide 25 MG  Orally Once a day  1 tablet  24h       
   Active







RESULTS







 Name  Result  Date  Reference Range

 

 A1C (IN HOUSE)         

 

 A1C IN HOUSE  11     4.3 - 5.6 %

 

 Previous A1c  10.1      

 

 Lot   [id number]      

 

 Exp date  10/2018      







PROCEDURES







 Procedure  Date Ordered  Related Diagnosis  Body Site

 

 GLYCATED HEMOGLOBIN TEST  2017      

 

 [facility] VISIT ESTABLISHED PATIENT  2017      

 

 Office Visit, Est Pt., Level 3  2017      







IMMUNIZATIONS

No Known Immunizations

## 2018-07-24 NOTE — XMS REPORT
[facility]

 Created on: 2018



Eddi Sesay

External Reference #: [id number]

: 1945

Sex: Male



Demographics







 Address  [address]  [address]

 

 Preferred Language  Unknown

 

 Marital Status  Unknown

 

 Buddhism Affiliation  Unknown

 

 Race  Unknown

 

 Ethnic Group  Unknown





Author







 Author  MALDONADO TOMPKINS

 

 [facility]

 

 Address  [address] [place]  [address]



 

 Phone  [phone]







Care Team Providers







 Care Team Member Name  Role  Phone

 

 MALDONADO TOMPKINS  Unavailable  [phone]







PROBLEMS







 Type  Condition  ICD9-CM Code  TAV77-QU Code  Onset Dates  Condition Status  
SNOMED Code

 

 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 
lower urinary tract symptoms [LUTS]  600.01        Active  458287531

 

 Problem  Hyperlipidemia     E78.5     Active  09127459

 

 Problem  Chronic kidney disease     N18.9     Active  177851679

 

 Problem  Type 2 diabetes mellitus with diabetic chronic kidney disease     
E11.22     Active  04204858

 

 Problem  Chronic kidney disease, stage II (mild)     N18.2     Active  
216528124

 

 Problem  Diabetes     E11.9     Active  49430828

 

 Problem  COPD (chronic obstructive pulmonary disease)     J44.9     Active  
45503052

 

 Problem  BPH NOS w ur obs/LUTS     N40.1     Active  358053725

 

 Problem  Hypertension     I10     Active  63184678







ALLERGIES

No Information



ENCOUNTERS







 Encounter  Location  Date  Diagnosis

 

 [facility]  [address] [place] [address]-
6855  20 Mar, 2018  BPH NOS w ur obs/LUTS N40.1 ; COPD (chronic obstructive 
pulmonary disease) J44.9 ; Hyperlipidemia E78.5 ; Hypertension I10 ; Type 2 
diabetes mellitus with diabetic chronic kidney disease E11.22 and Chronic 
kidney disease, stage II (mild) N18.2

 

 [facility]  [address], [place] [address]  19 Mar, 2018  Diabetes E11.9

 

 [facility]  [id number] [place] [address]-
8424  15 Aug, 2017  COPD (chronic obstructive pulmonary disease) J44.9

 

 [facility]  [address] [place] [address]-
2838  01 Aug, 2017  Hyperlipidemia E78.5

 

 [facility]  [address] [address]-
4447     

 

 [facility]  [address] [address]
6455     

 

 [facility]  [address] [address]
2487    Diabetes E11.9

 

 [facility]  [address] [address]
0732    Diabetes E11.9

 

 [facility]  [address] [address]-
1163    Hypertension I10 ; Diabetes E11.9 and COPD (chronic 
obstructive pulmonary disease) J44.9

 

 [facility]  [address] [address]-
2589    Diabetes E11.9 and Hypertension I10

 

 [facility]  [address] [address]
8836  13 Oct, 2016   

 

 [facility]  [address] [address]
1706  28 Sep, 2016  Hyperlipidemia E78.5

 

 [facility]  [address] [address]-
7358  27 Sep, 2016  Diabetes E11.9 and Hyperlipidemia E78.5

 

 [facility]  [address] [address]-
9510    Chronic kidney disease N18.9

 

 [facility]  [id number] [address]-
7258    Diabetes E11.9 ; Hypertension I10 ; Hyperlipidemia E78.5 
and COPD (chronic obstructive pulmonary disease) J44.9

 

 [facility]  [address] [address]-
6889  06 Oct, 2015   

 

 [facility]  [address] [address]-
9848  06 Oct, 2015  Altered metabolism due to diabetes E11.9

 

 [place] [address] [address] [address]-
4685  06 Oct, 2015  Chronic kidney disease N18.9

 

 [place] [id number] [place] [phone]
[phone]  05 Oct, 2015  Chronic kidney disease N18.9 and Back pain M54.9

 

 [place] [id number] [place] [address]-
7544  24 Sep, 2015  Abdominal pain 789.00

 

 [place] [id number] [place] [address]-
9013  08 Sep, 2015  Other and unspecified hyperlipidemia 272.4

 

 [place] [id number] [place] [address]-
2046  03 Sep, 2015  Diabetes mellitus without mention of complication, type II 
or unspecified type, not stated as uncontrolled 250.00

 

 [place] [id number] [place] [address]-
3439  28 Aug, 2015  Diabetes mellitus without mention of complication, type II 
or unspecified type, not stated as uncontrolled 250.00

 

 [place] [id number] [place] [address]-
0966    Diabetes mellitus without mention of complication, type II 
or unspecified type, uncontrolled 250.02 ; Diabetes mellitus without mention of 
complication, type II or unspecified type, not stated as uncontrolled 250.00 
and Acute sinusitis 461.9

 

 [place] [address] [place] [address]-
5005     

 

 [place] [address] [place] [address]-
9413     

 

 [place] [id number] [place] [address] 894645-
9711     

 

 [place] [address] [place] [phone]-
1498     

 

 [place] [address] [id number] [place] [address] [place] [address]-
1073  12 Dec, 2014   

 

 [facility] [place] [facility]  [address] [id number] [place] [address]-
1694  12 Dec, 2014   

 

 [facility] [place] [facility]  [address] [id number] [place] [address]
0058  08 Dec, 2014   

 

 [facility] [place] [facility]  [address] [id number] [place] [address]-
5275  08 Dec, 2014   

 

 [facility] [place] [facility]  [address] [id number] [place] [address]-
6799  08 Dec, 2014   

 

 [facility] [place] [facility]  [address] [id number] [place] [address]
4038  08 Dec, 2014   

 

 [facility] [place] [facility]  [address] [id number] [place] [address]-
9458     

 

 [facility] [place] [facility]  [address] [id number] [place] [address]-
2997     

 

 [facility] [place] [facility]  [address] [id number] [place] [address]-
9368  10 Oct, 2014   

 

 [facility] [place] [facility]  [address] [id number] [place] [address]-
6479  10 Oct, 2014   

 

 [facility] [place] [facility]  [address] [id number] [place] [address]-
6650  07 Oct, 2014   

 

 [facility] [place] [facility]  [address] [id number] [place] [address]-
5749  07 Oct, 2014   

 

 [facility] [place] [facility]  [address] [id number] [address]-
8099  04 Aug, 2014   

 

 [facility] [place] [facility]  [address] [id number] [place] [address]-
2221  04 Aug, 2014   

 

 [facility] [place] [facility]  [address] [id number] [place] [address]-
7301  05 May, 2014   

 

 [facility] [place] [facility]  [address] [id number] [place] [address]-
2737  05 May, 2014   

 

 [facility] [place] [facility]  [address] [id number] [place] [address]-
7484  08 2014   

 

 [facility] [place] [facility]  [address] [id number] [place] [address]-
2972     

 

 [facility] [place] [facility]  [address] [id number] [place] [address]-
1246     

 

 [facility] [place] [facility]  [address] [id number] [place] [address]-
9515     

 

 [facility] [place] [facility]  [address] [id number] [place] [address]-
4738     

 

 [facility] [place] [facility]  [address] [id number] [place] [address]-
5162     

 

 [facility] [place] [facility]  [address] [id number] [place] [address]-
9410     

 

 [facility] [place] [facility]  [address] [id number] [place] [address]-
6043     

 

 [facility] [place] [facility]  [address] [id number] [place] [address]-
9173     

 

 [facility] [place] [facility]  [address] [id number] [place] [address]-
9085     

 

 [facility] [place] [facility]  [address] [id number] [place] [address]-
7868  31 Mar, 2014   

 

 [facility] [place] [facility]  [address] [id number] [place] [address]-
1782  31 Mar, 2014   

 

 [facility] [place] [facility]  [address] [id number] [place] [address]-
7599  05 Mar, 2014   

 

 [facility] [place] [facility]  [address] [id number] [place] [address]-
1707  05 Mar, 2014   

 

 [facility] [place] [facility]  [address] [id number] [place] [address]-
4238     

 

 [facility] [place] [facility]  [address] [id number] [place] [address]-
9236     

 

 [facility] [place] [facility]  [address] [id number] [place] [address]-
1119     

 

 [facility] [place] [facility]  [address] [id number] [place] [address]-
4190     

 

 [facility] [place] [facility]  [address] [id number] [place] [address]-
0131     

 

 [facility] [place] [facility]  [address] [id number] [place] [address]-
1451     

 

 [facility] [place] [facility]  [address] [id number] [place] [address]-
6723     

 

 [facility] [place] [facility]  [address] [id number] [place] [address]-
6372     

 

 [facility] [place] [facility]  [address] [id number] [place] [address]-
5538  13 Aug, 2013   

 

 [facility] [place] [facility]  [address] [id number] [place] [address]-
1461     

 

 [facility] [place] [facility]  [address] [id number] [place] [address]-
8490     

 

 [facility] [place] [facility]  [address] [id number] [place] [address]-
2115  15 Jul, 2013   

 

 [facility] [place] [facility]  [address] [id number] [place] [address]-
9677     

 

 [facility] [place] [facility]  [address] [id number] [place] [address]-
3266     

 

 [facility] [place] [facility]  [address] [id number] [place] [address]-
6107  07 Mar, 2013   

 

 [facility] [place] [facility]  [address] [id number] [place] [address]-
4200     

 

 [facility] [place] [facility]  [address] [id number] [place] [address]-
0332     

 

 [facility] [place] [facility]  [address] [address]-
1516     

 

 [facility]  [address], [place] [address]-
7242  15 Khurram, 2013   

 

 [facility]  [address] [address]-
8696     

 

 [facility]  [address] [address]-
1256     

 

 [facility]  [address], [place] [address]-
8085     

 

 [facility]  [address] [place] [address]-
0710     

 

 [facility]  [address], [place] [address]-
5991  13 Sep, 2012   

 

 [facility]  [address] [place] [address]-
7476  22 May, 2012   

 

 [facility]  [address], [place] [address]-
2043     

 

 [facility]  [address] [address]-
5118     

 

 [facility]  [address] [address]-
9056     

 

 [facility]  [address] [address]-
4162     

 

 [facility]  [address], [place] [address]-
7134     

 

 [facility]  [address] [address]-
2543     







IMMUNIZATIONS

No Known Immunizations



SOCIAL HISTORY

Never Assessed



REASON FOR VISIT





PLAN OF CARE





VITAL SIGNS





MEDICATIONS







 Medication  Instructions  Dosage  Frequency  Start Date  End Date  Duration  
Status

 

 Trulicity 1.5 MG/0.5ML  Subcutaneous once weekly  0.5 ml     27 Sep, 2016     
   Active







RESULTS

No Results



PROCEDURES

No Known procedures



INSTRUCTIONS





MEDICATIONS ADMINISTERED

No Known Medications



MEDICAL (GENERAL) HISTORY







 Type  Description  Date

 

 Medical History  Type II diabetic   

 

 Medical History  hypertension   

 

 Surgical History  colon surgery   

 

 Surgical History  scar tissue removal   

 

 Surgical History  bilateral cataract surgeries   

 

 Hospitalization History  surgery

## 2018-07-24 NOTE — OPERATIVE REPORT
DATE OF SERVICE:  07/24/2018



PREOPERATIVE DIAGNOSIS:

Ascending colon mass and blood in stool.



POSTOPERATIVE DIAGNOSIS:

Ascending colon mass.



PROCEDURE:

Colonoscopy with biopsies of the ascending colon mass and the inking distal to

the mass and inking at the previous anastomosis.



SURGEON:

Korey Grijalva DO



ANESTHESIA:

Per CRNA.



ESTIMATED BLOOD LOSS:

Scant.



COMPLICATIONS:

None.



INDICATIONS:

The patient is a 73-year-old male who is having bleeding in the stools.  CT scan

suggesting an ascending colon mass.  He was explained risks and benefits of

procedure and wished to proceed with procedure.  Consent was signed on the

chart.



DESCRIPTION OF PROCEDURE:

The patient was taken to the endoscopy suite, placed in left lateral recumbent

position.  Timeout was performed.  Digital rectal exam was performed.  There

were no palpable polyps, masses or ulcerations.  Scope was inserted in the

rectum and advanced all the way to the cecum.  There were no polyps, masses or

ulcerations within the cecum.  Just distal to the cecum, there is a

large mass that occupies approximately 80% of the diameter of the colon. 

Biopsies were obtained.  The scope was slowly retracted just proximally and

Bel ink was used to fernando just distal to the mass in three different locations

using a total of 3 mL.  Scope was continued to be slowly retracted back.  There

were no other polyps, masses or ulcerations within the remainder of the

ascending colon and the transverse colon.  Then, the anastomosis was visualized

from previous; it was approximately 3 to 4 cm in diameter.  It does appear to be

maybe slightly strictured.  Otherwise, no other pathology noted.  This was then

inked with 2 mL total of Bel ink.  Scope was continued to be slowly retracted.

 There were no other polyps, masses or ulcerations within the remainder of the

colon.  Once in the rectum, it was also retroflexed noting no other pathology. 

Scope was returned to its normal position, slowly withdrawn until completely

removed.



RECOMMENDATIONS:

We will follow up on biopsies and will discuss further options once pathology is

completed.  If he has any problems prior to that, he should be reevaluated at

that time.





Job ID: [id number]

DocumentID: [id number]

Dictated Date:  07/24/2018 18:35:56

Transcription Date: 07/24/2018 22:17:25

Dictated By: KOREY GRIJALVA DO

[facility]

## 2018-07-24 NOTE — XMS REPORT
[facility]

 Created on: 2018



Eddi Sesay

External Reference #: [id number]

: 1945

Sex: Male



Demographics







 Address  [address]  [address]

 

 Preferred Language  Unknown

 

 Marital Status  Unknown

 

 Buddhism Affiliation  Unknown

 

 Race  Unknown

 

 Ethnic Group  Unknown





Author







 Author  MALDONADO TOMPKINS

 

 [facility]

 

 Address  [address] [place]  [address]



 

 Phone  [phone]







Care Team Providers







 Care Team Member Name  Role  Phone

 

 MALDONADO TOMPKINS  Unavailable  [phone]







PROBLEMS







 Type  Condition  ICD9-CM Code  LSM56-QQ Code  Onset Dates  Condition Status  
SNOMED Code

 

 Problem  Chronic kidney disease     N18.9     Active  808561775

 

 Problem  COPD (chronic obstructive pulmonary disease)     J44.9     Active  
79544319

 

 Problem  Hyperlipidemia     E78.5     Active  11139868

 

 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 
lower urinary tract symptoms [LUTS]  600.01        Active  013896924

 

 Problem  Other chronic pain     G89.29     Active  49152549

 

 Problem  Low back pain     M54.5     Active  122631746

 

 Problem  BPH NOS w ur obs/LUTS     N40.1     Active  629984267

 

 Problem  Hypertension     I10     Active  88548895

 

 Problem  Type 2 diabetes mellitus with diabetic chronic kidney disease     
E11.22     Active  76052525

 

 Problem  Chronic kidney disease, stage II (mild)     N18.2     Active  
[id number]







ALLERGIES

No Information



ENCOUNTERS







 Encounter  Location  Date  Diagnosis

 

 [place] [id number] [place] [address]-
0471  10 Jul, 2018  Chronic kidney disease, stage II (mild) N18.2

 

 [place] [id number] [place] [address]-
9594    Low back pain M54.5 ; Other chronic pain G89.29 and 
Retroperitoneal lymphadenopathy R59.0

 

 [place] [address] [address]-
1184  16 May, 2018  Medicare annual wellness visit, initial Z00.00 ; 
Hyperlipidemia E78.5 ; Type 2 diabetes mellitus with diabetic chronic kidney 
disease E11.22 ; COPD (chronic obstructive pulmonary disease) J44.9 ; Chronic 
kidney disease, stage II (mild) N18.2 ; Hypertension I10 ; BPH NOS w ur obs/
LUTS N40.1 and History of smoking Z87.891

 

 [facility] [place]  [address] [place] [address]
4319  14 May, 2018  Medicare annual wellness visit, initial Z00.00 ; Type 2 
diabetes mellitus with diabetic chronic kidney disease E11.22 ; COPD (chronic 
obstructive pulmonary disease) J44.9 ; Chronic kidney disease, stage II (mild) 
N18.2 ; Hypertension I10 ; Hyperlipidemia E78.5 ; BPH NOS w ur obs/LUTS N40.1 
and History of smoking Z87.891

 

 [facility] [place]  [address], [place] [address]-
9477  20 Mar, 2018  BPH NOS w ur obs/LUTS N40.1 ; COPD (chronic obstructive 
pulmonary disease) J44.9 ; Hyperlipidemia E78.5 ; Hypertension I10 ; Type 2 
diabetes mellitus with diabetic chronic kidney disease E11.22 and Chronic 
kidney disease, stage II (mild) N18.2

 

 [place] [address] [address]-
2107  19 Mar, 2018  Diabetes E11.9

 

 [facility] [place]  [address] [place] [address]-
2530  15 Aug, 2017  COPD (chronic obstructive pulmonary disease) J44.9

 

 [facility] [place]  [address] [place] [address]-
8646  01 Aug, 2017  Hyperlipidemia E78.5

 

 [facility] [place]  [address] [place] [address]-
3426     

 

 [facility] [place]  [address] [place] [address]
2546     

 

 [facility] [place]  [address] [place] [address]-
7670    Diabetes E11.9

 

 [facility] [place]  [address] [place] [address]-
2546    Diabetes E11.9

 

 [facility] [place]  [id number] [place] [address]-
7155    Hypertension I10 ; Diabetes E11.9 and COPD (chronic 
obstructive pulmonary disease) J44.9

 

 [facility] [place]  [id number] [place] [address]-
4422    Diabetes E11.9 and Hypertension I10

 

 [facility] [place]  [id number] [place] [address]-
0256  13 Oct, 2016   

 

 [facility] [place]  [address] [address]-
1312  28 Sep, 2016  Hyperlipidemia E78.5

 

 [facility] [place]  [address] [address]-
6247  27 Sep, 2016  Diabetes E11.9 and Hyperlipidemia E78.5

 

 [facility] [place]  [id number] [place] [address]-
6845    Chronic kidney disease N18.9

 

 [place] [id number] [place] [address]-
5225    Diabetes E11.9 ; Hypertension I10 ; Hyperlipidemia E78.5 
and COPD (chronic obstructive pulmonary disease) J44.9

 

 [place] [id number] [place] [address]-
0239  06 Oct, 2015   

 

 [facility] [place]  [id number] [place] [address]-
6538  06 Oct, 2015  Altered metabolism due to diabetes E11.9

 

 [facility] [place]  [id number] [place] [address]-
4708  06 Oct, 2015  Chronic kidney disease N18.9

 

 [facility] [place]  [id number] [place] [address]-
2226  05 Oct, 2015  Chronic kidney disease N18.9 and Back pain M54.9

 

 [facility] [place]  [id number] [place] [address]-
6409  24 Sep, 2015  Abdominal pain 789.00

 

 [place] [address] [address]-
4167  08 Sep, 2015  Other and unspecified hyperlipidemia 272.4

 

 [facility] [place]  [address] [address]-
7818  03 Sep, 2015  Diabetes mellitus without mention of complication, type II 
or unspecified type, not stated as uncontrolled 250.00

 

 [facility] [place]  [address] [address]-
7652  28 Aug, 2015  Diabetes mellitus without mention of complication, type II 
or unspecified type, not stated as uncontrolled 250.00

 

 [facility] [place]  [id number] [place] [address]-
8569    Diabetes mellitus without mention of complication, type II 
or unspecified type, uncontrolled 250.02 ; Diabetes mellitus without mention of 
complication, type II or unspecified type, not stated as uncontrolled 250.00 
and Acute sinusitis 461.9

 

 [facility] [place]  [address] [address]-
2344     

 

 [facility] [place]  [id number] [place] [address]-
6315     

 

 [facility] [place]  [address] [address]-
4879     

 

 [facility] [place]  [address] [place] [address]-
0966     

 

 [facility] [place]  [address], [place] [address]-
5682  12 Dec, 2014   

 

 [facility] [place]  [address] [address]-
3404  12 Dec, 2014   

 

 [facility] [place]  [address] [address]-
0588  08 Dec, 2014   

 

 [facility] [place]  [id number] [place] [address]-
0736  08 Dec, 2014   

 

 [facility] [place]  [address] [address]-
8809  08 Dec, 2014   

 

 [facility] [place]  [id number] [place] [address], [place] [address]-
3846  08 Dec, 2014   

 

 [facility] [place] [facility]  [address] [id number] [place] [address]-
6065     

 

 [facility] [place] [facility]  [address] [id number] [place] [address]-
3986     

 

 [facility] [place] [facility]  [address] [id number] [place] [address]-
2050  10 Oct, 2014   

 

 [facility] [place] [facility]  [address] [id number] [place] [address]-
2316  10 Oct, 2014   

 

 [facility] [place] [facility]  [address] [id number] [place] [address]
1923  07 Oct, 2014   

 

 [facility] [place] [facility]  [address] [id number] [place] [address]-
0535  07 Oct, 2014   

 

 [facility] [place] [facility]  [address] [id number] [place] [address]-
2907  04 Aug, 2014   

 

 [facility] [place] [facility]  [address] [id number] [place] [address]-
9124  04 Aug, 2014   

 

 [facility] [place] [facility]  [address] [id number] [place] [address]-
9195  05 May, 2014   

 

 [facility] [place] [facility]  [address] [facility] [id number] [place] [address]-
5698  05 May, 2014   

 

 [facility] [place] [facility]  [address] [id number] [place] [address]-
4419     

 

 [facility] [place] [facility]  [address] [id number] [place] [address]-
7326     

 

 [facility] [place] [facility]  [address] [id number] [place] [address]-
4065     

 

 [facility] [place] [facility]  [address] [id number] [place] [address]-
7131     

 

 [facility] [place] [facility]  [address] [id number] [place] [address]-
9882     

 

 [facility] [place] [facility]  [address] [id number] [place] [address]-
2731     

 

 [facility] [place] [facility]  [address] [id number] [place] [address]-
9401     

 

 [facility] [place] [facility]  [address] [id number] [place] [address]-
5299     

 

 [facility] [place] [facility]  [address] [id number] [place] [address]-
0448     

 

 [facility] [place] [facility]  [address] [id number] [place] [address]-
5783     

 

 [facility] [place] [facility]  [address] [id number] [place] [address]-
8385  31 Mar, 2014   

 

 [facility] [place] [facility]  [address] [id number] [place] [address]-
2724  31 Mar, 2014   

 

 [facility] [place] [facility]  [address] [id number] [place] [address]-
5472  05 Mar, 2014   

 

 [facility] [place] [facility]  [address] [id number] [place] [address]-
0634  05 Mar, 2014   

 

 [facility] [place] [facility]  [address] [id number] [place] [address]-
3214     

 

 [facility] [place] [facility]  [address] [id number] [place] [address]-
1786     

 

 [facility] [place] [facility]  [address] [id number] [place] [address]-
9234     

 

 [facility] [place] [facility]  [address] [id number] [place] [address]-
0597     

 

 [facility] [place] [facility]  [address] [id number] [place] [address]-
0778     

 

 [facility] [place] [facility]  [address] [id number] [place] [address]-
2615     

 

 [facility] [place] [facility]  [address] [id number] [place] [address]-
2546     

 

 [facility]  [address] [id number] [place] [address]-
7644     

 

 [facility] [place] [facility]  [address] [id number] [place] [address]-
6962  13 Aug, 2013   

 

 [facility] [place] [facility]  [address] [id number] [place] [address]-
6900     

 

 [facility] [place] [facility]  [address] [id number] [place] [address]-
6322     

 

 [facility] [place] [facility]  [address] [id number] [place] [address]-
9770  15 Jul, 2013   

 

 [facility] [place] [facility]  [address] [id number] [place] [address]-
5284     

 

 [facility] [place] [facility]  [address] [id number] [place] [address]-
4067     

 

 [facility] [place] [facility]  [address] [id number] [place] [address]-
2660  07 Mar, 2013   

 

 [facility] [place] [facility]  [address] [id number] [place] [address]-
5803     

 

 [facility] [place] [facility]  [address] [id number] [place] [address]-
8464     

 

 [facility] [place] [facility]  [address] [id number] [place] [address]-
3737     

 

 [facility] [place] [facility]  [address] [id number] [place] [address]-
9392  15 Khurram, 2013   

 

 [facility] [place] [facility]  [address] [id number] [place] [address]-
0975     

 

 [facility] [place] [facility]  [address] [id number] [place]
8075     

 

 [facility] [place] [facility]  [address] [id number] [place] [address]-
9646     

 

 [facility] [place] [facility]  [address] [id number] [address]-
0106     

 

 [facility] [place]  [id number], [place] [address]-
4466  13 Sep, 2012   

 

 [facility] [place]  [address], [place] [address]-
2546  22 May, 2012   

 

 [facility] [place]  [id number], [place] [address]-
0386     

 

 [facility] [place]  [address], [place] [address]-
7576     

 

 [facility] [place]  [address], [place] [address]-
5566     

 

 [facility] [place]  [address], [place] [address]-
2976     

 

 [facility] [place]  [address], [place] [address]-
4276     

 

 [facility] [place]  [id number], [place] [address]-
1056     







IMMUNIZATIONS

No Known Immunizations



SOCIAL HISTORY

Never Assessed



REASON FOR VISIT

90 day supplies



PLAN OF CARE





VITAL SIGNS





MEDICATIONS







 Medication  Instructions  Dosage  Frequency  Start Date  End Date  Duration  
Status

 

 Trulicity 1.5 MG/0.5ML  Subcutaneous once weekly  0.5 ml     27 Sep, 2016  14 
Mar, 2019  90 days  Active







RESULTS

No Results



PROCEDURES

No Known procedures



INSTRUCTIONS





MEDICATIONS ADMINISTERED

No Known Medications



MEDICAL (GENERAL) HISTORY







 Type  Description  Date

 

 Medical History  Type II diabetic   

 

 Medical History  hypertension   

 

 Surgical History  colon surgery   

 

 Surgical History  scar tissue removal   

 

 Surgical History  bilateral cataract surgeries   

 

 Hospitalization History  surgery   

 

 Hospitalization History  [facility] - Back pain

## 2018-07-24 NOTE — XMS REPORT
[facility]

 Created on: 04/10/2018



Eddi Sesay

External Reference #: [id number]

: 1945

Sex: Male



Demographics







 Address  [address]  [address]

 

 Preferred Language  Unknown

 

 Marital Status  Unknown

 

 Nondenominational Affiliation  Unknown

 

 Race  Unknown

 

 Ethnic Group  Unknown





Author







 Author  MALDONADO TOMPKINS

 

 [facility]

 

 Address  [address] [place]  [address]



 

 Phone  [phone]







Care Team Providers







 Care Team Member Name  Role  Phone

 

 MALDONADO TOMPKINS  Unavailable  [phone]







PROBLEMS







 Type  Condition  ICD9-CM Code  SVD06-JF Code  Onset Dates  Condition Status  
SNOMED Code

 

 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 
lower urinary tract symptoms [LUTS]  600.01        Active  263988771

 

 Problem  Hyperlipidemia     E78.5     Active  40014743

 

 Problem  Chronic kidney disease     N18.9     Active  563800589

 

 Problem  Type 2 diabetes mellitus with diabetic chronic kidney disease     
E11.22     Active  17051461

 

 Problem  Chronic kidney disease, stage II (mild)     N18.2     Active  
217167979

 

 Problem  Diabetes     E11.9     Active  66672260

 

 Problem  COPD (chronic obstructive pulmonary disease)     J44.9     Active  
28626032

 

 Problem  BPH NOS w ur obs/LUTS     N40.1     Active  232058818

 

 Problem  Hypertension     I10     Active  36713595







ALLERGIES

No Information



ENCOUNTERS







 Encounter  Location  Date  Diagnosis

 

 [facility]  [address] [place] [address]-
1318  20 Mar, 2018  BPH NOS w ur obs/LUTS N40.1 ; COPD (chronic obstructive 
pulmonary disease) J44.9 ; Hyperlipidemia E78.5 ; Hypertension I10 ; Type 2 
diabetes mellitus with diabetic chronic kidney disease E11.22 and Chronic 
kidney disease, stage II (mild) N18.2

 

 [facility]  [address] [address]
4825  19 Mar, 2018  Diabetes E11.9

 

 [facility]  [id number] [place] [address]-
4176  15 Aug, 2017  COPD (chronic obstructive pulmonary disease) J44.9

 

 [facility]  [address] [place] [address]-
5289  01 Aug, 2017  Hyperlipidemia E78.5

 

 [facility]  [address] [address]
1582     

 

 [facility]  [address] [address]
2161     

 

 [facility]  [address] [address]
1041    Diabetes E11.9

 

 [facility]  [address] [address]
6100    Diabetes E11.9

 

 [facility]  [address] [address]-
3179    Hypertension I10 ; Diabetes E11.9 and COPD (chronic 
obstructive pulmonary disease) J44.9

 

 [facility]  [address] [address]
8598    Diabetes E11.9 and Hypertension I10

 

 [facility]  [address] [address]
6007  13 Oct, 2016   

 

 [facility]  [address] [address]
8922  28 Sep, 2016  Hyperlipidemia E78.5

 

 [facility]  [address] [address]
5866  27 Sep, 2016  Diabetes E11.9 and Hyperlipidemia E78.5

 

 [facility]  [address] [address]-
6641    Chronic kidney disease N18.9

 

 [facility]  [id number] [address]-
9296    Diabetes E11.9 ; Hypertension I10 ; Hyperlipidemia E78.5 
and COPD (chronic obstructive pulmonary disease) J44.9

 

 [facility]  [address] [address]-
1667  06 Oct, 2015   

 

 [facility]  [address] [address]-
7226  06 Oct, 2015  Altered metabolism due to diabetes E11.9

 

 [place] [address] [address] [address]-
7210  06 Oct, 2015  Chronic kidney disease N18.9

 

 [place] [id number] [place] [id number]-
1402  05 Oct, 2015  Chronic kidney disease N18.9 and Back pain M54.9

 

 [place] [id number] [place] [address]-
5453  24 Sep, 2015  Abdominal pain 789.00

 

 [place] [id number] [place] [address]-
3700  08 Sep, 2015  Other and unspecified hyperlipidemia 272.4

 

 [place] [id number] [place] [address]-
5733  03 Sep, 2015  Diabetes mellitus without mention of complication, type II 
or unspecified type, not stated as uncontrolled 250.00

 

 [place] [id number] [place] [address]-
5121  28 Aug, 2015  Diabetes mellitus without mention of complication, type II 
or unspecified type, not stated as uncontrolled 250.00

 

 [place] [id number] [place] [address]-
4741    Diabetes mellitus without mention of complication, type II 
or unspecified type, uncontrolled 250.02 ; Diabetes mellitus without mention of 
complication, type II or unspecified type, not stated as uncontrolled 250.00 
and Acute sinusitis 461.9

 

 [place] [address] [place] [address]-
5677     

 

 [place] [address] [place] [address]-
9950     

 

 [place] [id number] [place] [address]-
4648     

 

 [place] [address] [place] [id number]-
7176     

 

 [place] [id number] [place] [address] [place] [address]-
1738  12 Dec, 2014   

 

 [facility] [place] [facility]  [address] [id number] [place]
1388  12 Dec, 2014   

 

 [facility] [place] [facility]  [address] [id number] [place] [address]
1135  08 Dec, 2014   

 

 [facility] [place] [facility]  [address] [id number] [place] [address]-
2368  08 Dec, 2014   

 

 [facility] [place] [facility]  [address] [id number] [place] [address]-
9844  08 Dec, 2014   

 

 [facility] [place] [facility]  [address] [id number] [place]
3438  08 Dec, 2014   

 

 [facility] [place] [facility]  [address] [id number] [place] [address]-
8135     

 

 [facility] [place] [facility]  [address] [id number] [place] [address]-
8450     

 

 [facility] [place] [facility]  [address] [id number] [place] [address]-
4379  10 Oct, 2014   

 

 [facility] [place] [facility]  [address] [id number] [place] [address]-
7860  10 Oct, 2014   

 

 [facility] [place] [facility]  [address] [id number] [place] [address]-
4524  07 Oct, 2014   

 

 [facility] [place] [facility]  [address] [id number] [place] [address]-
9500  07 Oct, 2014   

 

 [facility] [place] [facility]  [address] [id number] [address]-
1546  04 Aug, 2014   

 

 [facility] [place] [facility]  [address] [id number] [place] [address]-
4289  04 Aug, 2014   

 

 [facility] [place] [facility]  [address] [id number] [place] [address]-
4838  05 May, 2014   

 

 [facility] [place] [facility]  [address] [id number] [place] [address]-
6093  05 May, 2014   

 

 [facility] [place] [facility]  [address] [id number] [place] [address]-
3185  08 2014   

 

 [facility] [place] [facility]  [address] [id number] [place] [address]-
9062     

 

 [facility] [place] [facility]  [address] [id number] [place] [address]-
9956     

 

 [facility] [place] [facility]  [address] [id number] [place] [address]-
4995     

 

 [facility] [place] [facility]  [address] [id number] [place] [address]-
8671     

 

 [facility] [place] [facility]  [address] [id number] [place] [address]-
1582     

 

 [facility] [place] [facility]  [address] [id number] [place] [address]-
6169     

 

 [facility] [place] [facility]  [address] [id number] [place] [address]-
8389     

 

 [facility] [place] [facility]  [address] [id number] [place] [address]-
1348     

 

 [facility] [place] [facility]  [address] [id number] [place] [address]-
2394     

 

 [facility] [place] [facility]  [address] [id number] [place] [address]-
7146  31 Mar, 2014   

 

 [facility] [place] [facility]  [address] [id number] [place] [address]-
7463  31 Mar, 2014   

 

 [facility] [place] [facility]  [address] [id number] [place] [address]-
4615  05 Mar, 2014   

 

 [facility] [place] [facility]  [address] [id number] [place] [address]-
2408  05 Mar, 2014   

 

 [facility] [place] [facility]  [address] [id number] [place] [address]-
8147     

 

 [facility] [place] [facility]  [address] [id number] [place] [address]-
7402     

 

 [facility] [place] [facility]  [address] [id number] [place] [address]-
4455     

 

 [facility] [place] [facility]  [address] [id number] [place] [address]-
9268     

 

 [facility] [place] [facility]  [address] [id number] [place] [address]-
4406     

 

 [facility] [place] [facility]  [address] [id number] [place] [address]-
5424     

 

 [facility] [place] [facility]  [address] [id number] [place] [address]-
0691     

 

 [facility] [place] [facility]  [address] [id number] [place] [address]-
4399     

 

 [facility] [place] [facility]  [address] [id number] [place] [address]-
3961  13 Aug, 2013   

 

 [facility] [place] [facility]  [address] [id number] [place] [address]-
8970     

 

 [facility] [place] [facility]  [address] [id number] [place] [address]-
2851     

 

 [facility] [place] [facility]  [address] [id number] [place] [address]-
8454  15 Jul, 2013   

 

 [facility] [place] [facility]  [address] [id number] [place] [address]-
2743     

 

 [facility] [place] [facility]  [address] [id number] [place] [address]-
9559     

 

 [facility] [place] [facility]  [address] [id number] [place] [address]-
2841  07 Mar, 2013   

 

 [facility] [place] [facility]  [address] [id number] [place] [address]-
4310     

 

 [facility] [place] [facility]  [address] [id number] [place] [address]-
4705     

 

 [facility] [place] [facility]  [address] [address]-
5500     

 

 [facility]  [address] [address]-
3423  15 Khurram, 2013   

 

 [facility]  [address] [address]-
4467     

 

 [facility]  [address] [address]-
0358     

 

 [facility]  [address] [address]-
6076     

 

 [facility]  [address] [place] [address]-
9150     

 

 [facility]  [address] [address]-
7754  13 Sep, 2012   

 

 [facility]  [address] [place] [address]-
3652  22 May, 2012   

 

 [facility]  [address] [address]-
0263     

 

 [facility]  [address], [place] [address]-
9797     

 

 [facility]  [address] [address]-
7416     

 

 [facility]  [address] [address]-
6775     

 

 [facility]  [address], [place] [address]-
8517     

 

 [facility]  [address] [address]-
0871     







IMMUNIZATIONS

No Known Immunizations



SOCIAL HISTORY

Never Assessed



REASON FOR VISIT

Refill request



PLAN OF CARE





VITAL SIGNS





MEDICATIONS







 Medication  Instructions  Dosage  Frequency  Start Date  End Date  Duration  
Status

 

 Trulicity 1.5 MG/0.5ML  Subcutaneous once weekly  0.5 ml     27 Sep, 2016     
   Active







RESULTS

No Results



PROCEDURES

No Known procedures



INSTRUCTIONS





MEDICATIONS ADMINISTERED

No Known Medications



MEDICAL (GENERAL) HISTORY







 Type  Description  Date

 

 Medical History  Type II diabetic   

 

 Medical History  hypertension   

 

 Surgical History  colon surgery   

 

 Surgical History  scar tissue removal   

 

 Surgical History  bilateral cataract surgeries   

 

 Hospitalization History  surgery

## 2018-07-24 NOTE — PROGRESS NOTE-PRE OPERATIVE
Pre-Operative Progress Note


H&P Reviewed


The H&P was reviewed, patient examined and no changes noted.


Date Seen by Provider:  Jul 24, 2018


Time Seen by Provider:  15:07


Date H&P Reviewed:  Jul 24, 2018


Time H&P Reviewed:  15:07


Pre-Operative Diagnosis:  ascending colon mass, blood in stool











KOREY MARTIN DO Jul 24, 2018 15:08

## 2018-07-24 NOTE — PROGRESS NOTE-POST OPERATIVE
Post-Operative Progess Note


Surgeon (s)/Assistant (s)


Surgeon


KOREY MARTIN DO


Assistant:  na





Pre-Operative Diagnosis


ascending colon mass, blood in stool





Post-Operative Diagnosis





ascending colon mass





Procedure & Operative Findings


Date of Procedure


7/24/18


Procedure Performed/Findings


colonoscopy with biopsies of ascending colon mass olivier inking of mass and 

previous anastomosis


Anesthesia Type


per crna





Estimated Blood Loss


Estimated blood loss (mL):  scant





Specimens/Packing


Specimens Removed


ascending colon mass











KOREY MARTIN DO Jul 24, 2018 18:30

## 2018-07-24 NOTE — XMS REPORT
[facility]

 Created on: 10/08/2015



Eddi Sesay

External Reference #: [id number]

: 1945

Sex: Male



Demographics







 Address  [address] [place]  [address]

 

 Home Phone  [phone]

 

 Preferred Language  Unknown

 

 Marital Status  Unknown

 

 Judaism Affiliation  Unknown

 

 Race  White

 

 Ethnic Group  Not  or 





Author







 MALDONADO Catalan

 

 [facility]  [employer]

 

 Address  Unknown

 

 Phone  Unavailable







Care Team Providers







 Care Team Member Name  Role  Phone

 

 MALDONADO TOMPKINS  CP  Unavailable



                                                                



Allergies, Adverse Reactions, Alerts

          





 Substance  Reaction  Event Type

 

 Morphine  Info Not Available  Drug Allergy



                                                                               
         



Problems

          





 Problem Type  Condition  Code  Onset Dates  Condition Status

 

 Problem  Other abnormal glucose  790.29     Active

 

 Problem  Shortness of breath  786.05     Active

 

 Problem  Unspecified prostatitis  601.9     Active

 

 Assessment  Back pain  M54.9     Active

 

 Assessment  Chronic kidney disease  N18.9     Active

 

 Problem  Other and unspecified hyperlipidemia  272.4     Active

 

 Problem  Cervicalgia  723.1     Active

 

 Problem  Chronic kidney disease  N18.9     Active

 

 Problem  Diabetes mellitus without mention of complication, type II or 
unspecified type, uncontrolled  250.02     Active

 

 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 
lower urinary tract symptoms [LUTS]  600.01     Active

 

 Problem  Diabetes mellitus without mention of complication, type II or 
unspecified type, not stated as uncontrolled  250.00     Active

 

 Problem  Chronic airway obstruction, not elsewhere classified  496     Active



                                                                               
                                                                               
                                        



Medications

          





 Medication  Code System  Code  Instructions  Start Date  End Date  Status  
Dosage

 

 Zantac  NDC  [address]-18  150 MG Orally Twice a day  2015        1 
tablet

 

 Metformin HCl  NDC  00205120652  1000MG             TAKE ONE TABLET BY MOUTH 
TWICE DAILY ....  MUST  HAVE  FOLLOW  UP  APPT  FOR  FURTHER  REFILLS

 

 Jardiance  NDC  [address]-12  25 MG Orally Once a day           1 tablet

 

 tramadol  NDC  0  50 mg    Dec 08, 2014        take 1 tablet by Oral route 
every 6 hours as needed  PRN pain

 

 GlipiZIDE  NDC  39254564472  5MG             TAKE ONE TABLET BY MOUTH ONCE 
DAILY

 

 Finasteride  NDC  55262624611  5MG             TAKE ONE TABLET BY MOUTH ONCE 
DAILY

 

 Lipitor  NDC  [address]-03  10 MG Orally Once a day  2015        1 
tablet

 

 Lisinopril  NDC  53577775084  20MG             TAKE ONE TABLET BY MOUTH ONCE 
DAILY, NEED FOLLOW UP APPT



                                                                               
                                                                               



Procedures

          





 Procedure  Coding System  Code  Date

 

 URINALYSIS, AUTO, W/O SCOPE  CPT-4  06909  Oct 05, 2015

 

 FQ VISIT ESTABLISHED PATIENT  CPT-4    Oct 05, 2015

 

 [facility]  [facility]T-[facility]  NOBLL  Oct 05, 2015

 

 VENIPUNCT, ROUTINE*  CPT-4  82305  Oct 05, 2015

 

 Office Visit, Est Pt., Level 4  CPT-4  27446  Oct 05, 2015



                                                                               
                                                           



Vital Signs

          





 Date/Time:  Oct 05, 2015

 

 Temperature  98.0 F

 

 Weight  205 lbs

 

 Height  70 in

 

 BMI  29.41 Index

 

 Blood Pressure Diastolic  76 mmHg

 

 Blood Pressure Systolic  138 mmHg

 

 Cardiac Monitoring Heart Rate  76 bpm



                                                                    



Results

          





 Name  Result  Date  Reference Range  Unit  Abnormality Flag

 

 ROUTINE VENIPUNCTURE               

 

 BMP               



                                                                              



Summary Purpose

          eClinicalWorks Submission

## 2018-07-24 NOTE — ANESTHESIA-GENERAL POST-OP
MAC


Patient Condition


Mental Status/LOC:  Same as Preop


Cardiovascular:  Satisfactory


Nausea/Vomiting:  Absent


Respiratory:  Satisfactory


Pain:  Controlled


Complications:  Absent





Post Op Complications


Complications


None





Follow Up Care/Instructions


Patient Instructions


None needed.





Anesthesiology Discharge Order


Discharge Order


Patient is doing well, no complaints, stable vital signs, no apparent adverse 

anesthesia problems.   


No complications reported per nursing.











RUEL TAN CRNA Jul 24, 2018 17:12

## 2018-08-14 ENCOUNTER — HOSPITAL ENCOUNTER (EMERGENCY)
Dept: HOSPITAL 75 - ER | Age: 73
Discharge: HOME | End: 2018-08-14
Payer: MEDICARE

## 2018-08-14 ENCOUNTER — HOSPITAL ENCOUNTER (OUTPATIENT)
Dept: HOSPITAL 75 - PREOP | Age: 73
Discharge: HOME | End: 2018-08-14
Attending: SURGERY
Payer: MEDICARE

## 2018-08-14 VITALS — HEIGHT: 70 IN | BODY MASS INDEX: 27.84 KG/M2 | WEIGHT: 194.5 LBS

## 2018-08-14 VITALS — WEIGHT: 194 LBS | BODY MASS INDEX: 28.73 KG/M2 | HEIGHT: 69 IN

## 2018-08-14 VITALS — SYSTOLIC BLOOD PRESSURE: 137 MMHG | DIASTOLIC BLOOD PRESSURE: 77 MMHG

## 2018-08-14 VITALS — DIASTOLIC BLOOD PRESSURE: 80 MMHG | SYSTOLIC BLOOD PRESSURE: 129 MMHG

## 2018-08-14 DIAGNOSIS — I10: ICD-10-CM

## 2018-08-14 DIAGNOSIS — K63.9: ICD-10-CM

## 2018-08-14 DIAGNOSIS — Z01.812: Primary | ICD-10-CM

## 2018-08-14 DIAGNOSIS — Z11.2: ICD-10-CM

## 2018-08-14 DIAGNOSIS — Z87.891: ICD-10-CM

## 2018-08-14 DIAGNOSIS — Z80.0: ICD-10-CM

## 2018-08-14 DIAGNOSIS — Z88.6: ICD-10-CM

## 2018-08-14 DIAGNOSIS — E11.65: Primary | ICD-10-CM

## 2018-08-14 DIAGNOSIS — J44.9: ICD-10-CM

## 2018-08-14 DIAGNOSIS — K21.9: ICD-10-CM

## 2018-08-14 DIAGNOSIS — Z90.49: ICD-10-CM

## 2018-08-14 DIAGNOSIS — Z79.84: ICD-10-CM

## 2018-08-14 DIAGNOSIS — Z85.038: ICD-10-CM

## 2018-08-14 DIAGNOSIS — Z91.14: ICD-10-CM

## 2018-08-14 LAB
ALBUMIN SERPL-MCNC: 3.9 GM/DL (ref 3.2–4.5)
ALP SERPL-CCNC: 98 U/L (ref 40–136)
ALT SERPL-CCNC: 14 U/L (ref 0–55)
AMYLASE SERPL-CCNC: 122 U/L (ref 25–125)
APTT PPP: YELLOW S
BACTERIA #/AREA URNS HPF: NEGATIVE /HPF
BASOPHILS # BLD AUTO: 0.1 10^3/UL (ref 0–0.1)
BASOPHILS # BLD AUTO: 0.1 10^3/UL (ref 0–0.1)
BASOPHILS NFR BLD AUTO: 1 % (ref 0–10)
BASOPHILS NFR BLD AUTO: 1 % (ref 0–10)
BILIRUB SERPL-MCNC: 0.5 MG/DL (ref 0.1–1)
BILIRUB UR QL STRIP: NEGATIVE
BUN/CREAT SERPL: 7
BUN/CREAT SERPL: 8
CALCIUM SERPL-MCNC: 9.5 MG/DL (ref 8.5–10.1)
CALCIUM SERPL-MCNC: 9.5 MG/DL (ref 8.5–10.1)
CHLORIDE SERPL-SCNC: 96 MMOL/L (ref 98–107)
CHLORIDE SERPL-SCNC: 96 MMOL/L (ref 98–107)
CO2 SERPL-SCNC: 23 MMOL/L (ref 21–32)
CO2 SERPL-SCNC: 24 MMOL/L (ref 21–32)
CREAT SERPL-MCNC: 1.41 MG/DL (ref 0.6–1.3)
CREAT SERPL-MCNC: 1.43 MG/DL (ref 0.6–1.3)
EOSINOPHIL # BLD AUTO: 0.2 10^3/UL (ref 0–0.3)
EOSINOPHIL # BLD AUTO: 0.3 10^3/UL (ref 0–0.3)
EOSINOPHIL NFR BLD AUTO: 2 % (ref 0–10)
EOSINOPHIL NFR BLD AUTO: 2 % (ref 0–10)
ERYTHROCYTE [DISTWIDTH] IN BLOOD BY AUTOMATED COUNT: 14.8 % (ref 10–14.5)
ERYTHROCYTE [DISTWIDTH] IN BLOOD BY AUTOMATED COUNT: 14.9 % (ref 10–14.5)
FIBRINOGEN PPP-MCNC: CLEAR MG/DL
GFR SERPLBLD BASED ON 1.73 SQ M-ARVRAT: 49 ML/MIN
GFR SERPLBLD BASED ON 1.73 SQ M-ARVRAT: 50 ML/MIN
GLUCOSE SERPL-MCNC: 310 MG/DL (ref 70–105)
GLUCOSE SERPL-MCNC: 516 MG/DL (ref 70–105)
GLUCOSE UR STRIP-MCNC: (no result) MG/DL
HCT VFR BLD CALC: 33 % (ref 40–54)
HCT VFR BLD CALC: 33 % (ref 40–54)
HGB BLD-MCNC: 10.7 G/DL (ref 13.3–17.7)
HGB BLD-MCNC: 10.8 G/DL (ref 13.3–17.7)
KETONES UR QL STRIP: NEGATIVE
LEUKOCYTE ESTERASE UR QL STRIP: NEGATIVE
LIPASE SERPL-CCNC: 285 U/L (ref 8–78)
LYMPHOCYTES # BLD AUTO: 1.3 X 10^3 (ref 1–4)
LYMPHOCYTES # BLD AUTO: 1.6 X 10^3 (ref 1–4)
LYMPHOCYTES NFR BLD AUTO: 12 % (ref 12–44)
LYMPHOCYTES NFR BLD AUTO: 13 % (ref 12–44)
MANUAL DIFFERENTIAL PERFORMED BLD QL: NO
MANUAL DIFFERENTIAL PERFORMED BLD QL: NO
MCH RBC QN AUTO: 24 PG (ref 25–34)
MCH RBC QN AUTO: 24 PG (ref 25–34)
MCHC RBC AUTO-ENTMCNC: 33 G/DL (ref 32–36)
MCHC RBC AUTO-ENTMCNC: 33 G/DL (ref 32–36)
MCV RBC AUTO: 72 FL (ref 80–99)
MCV RBC AUTO: 72 FL (ref 80–99)
MONOCYTES # BLD AUTO: 1.1 X 10^3 (ref 0–1)
MONOCYTES # BLD AUTO: 1.4 X 10^3 (ref 0–1)
MONOCYTES NFR BLD AUTO: 11 % (ref 0–12)
MONOCYTES NFR BLD AUTO: 11 % (ref 0–12)
NEUTROPHILS # BLD AUTO: 7.6 X 10^3 (ref 1.8–7.8)
NEUTROPHILS # BLD AUTO: 9.1 X 10^3 (ref 1.8–7.8)
NEUTROPHILS NFR BLD AUTO: 74 % (ref 42–75)
NEUTROPHILS NFR BLD AUTO: 74 % (ref 42–75)
NITRITE UR QL STRIP: NEGATIVE
PH UR STRIP: 5 [PH] (ref 5–9)
PLATELET # BLD: 298 10^3/UL (ref 130–400)
PLATELET # BLD: 307 10^3/UL (ref 130–400)
PMV BLD AUTO: 10.3 FL (ref 7.4–10.4)
PMV BLD AUTO: 10.8 FL (ref 7.4–10.4)
POTASSIUM SERPL-SCNC: 4 MMOL/L (ref 3.6–5)
POTASSIUM SERPL-SCNC: 4.4 MMOL/L (ref 3.6–5)
PROT SERPL-MCNC: 6.8 GM/DL (ref 6.4–8.2)
PROT UR QL STRIP: NEGATIVE
RBC # BLD AUTO: 4.56 10^6/UL (ref 4.35–5.85)
RBC # BLD AUTO: 4.59 10^6/UL (ref 4.35–5.85)
RBC #/AREA URNS HPF: (no result) /HPF
SODIUM SERPL-SCNC: 130 MMOL/L (ref 135–145)
SODIUM SERPL-SCNC: 131 MMOL/L (ref 135–145)
SP GR UR STRIP: 1 (ref 1.02–1.02)
UROBILINOGEN UR-MCNC: NORMAL MG/DL
WBC # BLD AUTO: 10.3 10^3/UL (ref 4.3–11)
WBC # BLD AUTO: 12.4 10^3/UL (ref 4.3–11)
WBC #/AREA URNS HPF: (no result) /HPF

## 2018-08-14 PROCEDURE — 83690 ASSAY OF LIPASE: CPT

## 2018-08-14 PROCEDURE — 82962 GLUCOSE BLOOD TEST: CPT

## 2018-08-14 PROCEDURE — 85025 COMPLETE CBC W/AUTO DIFF WBC: CPT

## 2018-08-14 PROCEDURE — 80048 BASIC METABOLIC PNL TOTAL CA: CPT

## 2018-08-14 PROCEDURE — 81000 URINALYSIS NONAUTO W/SCOPE: CPT

## 2018-08-14 PROCEDURE — 87081 CULTURE SCREEN ONLY: CPT

## 2018-08-14 PROCEDURE — 36415 COLL VENOUS BLD VENIPUNCTURE: CPT

## 2018-08-14 PROCEDURE — 80053 COMPREHEN METABOLIC PANEL: CPT

## 2018-08-14 PROCEDURE — 82150 ASSAY OF AMYLASE: CPT

## 2018-08-14 NOTE — XMS REPORT
Continuity of Care Document

 Created on: 2018



CATHI CASTELLANOS SR.

External Reference #: [id number]

: 1945

Sex: Male



Demographics







 [place]  [address] [place]  [address]

 

 Home Phone  [phone] x

 

 Preferred Language  Unknown

 

 Marital Status  Unknown

 

 Taoism Affiliation  Unknown

 

 Race  Unknown

 

 Ethnic Group  Unknown





Author







 Author  [facility]

 

 Address  Unknown

 

 Phone  Unavailable



              



Allergies

      





 Active            Description            Code            Type            
Severity            Reaction            Onset            Reported/Identified   
         Relationship to Patient            Clinical Status        

 

 Yes            morphine                         Drug Allergy            N/A   
         N/A                         2014                                
  

 

 Yes            No Known Drug Allergies            [id number]            Drug 
Allergy            Unknown            N/A                         2018   
                               



                    



Medications

      



There is no data.                  



Problems

      





 Date Dx Coded            Attending            Type            Code            
Diagnosis            Diagnosed By        

 

 2011                                      401.9            UNSPECIFIED 
ESSENTIAL HYPERTENSION                     

 

 2011                                      401.9            UNSPECIFIED 
ESSENTIAL HYPERTENSION                     

 

 2011                                      401.9            UNSPECIFIED 
ESSENTIAL HYPERTENSION                     

 

 2011            TURNER HANSON, MALDONADO                         401.9         
   UNSPECIFIED ESSENTIAL HYPERTENSION                     

 

 2011            TURNER HANSON, MALDONADO                         401.9         
   UNSPECIFIED ESSENTIAL HYPERTENSION                     

 

 2011            TURNER HANSON, MALDONADO                         401.9         
   UNSPECIFIED ESSENTIAL HYPERTENSION                     

 

 2011            TURNER HANSON, MALDONADO                         401.9         
   UNSPECIFIED ESSENTIAL HYPERTENSION                     

 

 2011            TURNER HANSON, MALDONADO                         401.9         
   UNSPECIFIED ESSENTIAL HYPERTENSION                     

 

 2011            TURNER HANSON, MALDONADO                         401.9         
   UNSPECIFIED ESSENTIAL HYPERTENSION                     

 

 2011            TURNER HANSON, MALDONADO                         401.9         
   UNSPECIFIED ESSENTIAL HYPERTENSION                     

 

 2012                                      790.29            
HYPERGLYCEMIA                     

 

 2012                                      790.29            
Hyperglycemia                     

 

 2012                                      790.29            
Hyperglycemia                     

 

 2012            MALDONADO TOMPKINS MD                         790.29        
    Hyperglycemia                     

 

 2012            TURNER HANSON, MALDONADO                         790.29        
    Hyperglycemia                     

 

 2012            TURNER HANSON, MALDONADO                         790.29        
    Hyperglycemia                     

 

 2012            MALDONADO TOMPKINS MD                         790.29        
    Hyperglycemia                     

 

 2012            TURNER HANSON, MALDONADO                         790.29        
    Hyperglycemia                     

 

 2012            TURNER HANSON, MALDONADO                         790.29        
    Hyperglycemia                     

 

 2012            MALDONADO TOMPKINS MD                         790.29        
    HYPERGLYCEMIA                     

 

 2012                                      250.00            DIABETES 
MELLITUS TYPE 2                     

 

 2012                                      250.00            DIABETES 
MELLITUS TYPE 2                     

 

 2012                                      250.00            DIABETES 
MELLITUS TYPE 2                     

 

 2012            TURNER HANSON, MALDONADO                         250.00        
    DIABETES MELLITUS TYPE 2                     

 

 2012            TURNER HANSON, MALDONADO                         250.00        
    DIABETES MELLITUS TYPE 2                     

 

 2012            TURNER HANSON, MALDONADO                         250.00        
    DIABETES MELLITUS TYPE 2                     

 

 2012            TURNER HANSON, MALDONADO                         250.00        
    DIABETES MELLITUS TYPE 2                     

 

 2012            TURNER HANSON, MALDONADO                         250.00        
    DIABETES MELLITUS TYPE 2                     

 

 2012            TURNER HANSON, MALDONADO                         250.00        
    DIABETES MELLITUS TYPE 2                     

 

 2012            TURNER HANSON, MALDONADO                         250.00        
    DIABETES MELLITUS TYPE 2                     

 

 2012                                      601.9            PROSTATITIS 
UNSPECIFIED                     

 

 2012                                      601.9            Prostatitis 
Unspecified                     

 

 2012                                      601.9            Prostatitis 
Unspecified                     

 

 2012            TURNER HANSON, MALDONADO                         601.9         
   Prostatitis Unspecified                     

 

 2012            TURNER HANSON, MALDONADO                         601.9         
   Prostatitis Unspecified                     

 

 2012            TURNER HANSON, MALDONADO                         601.9         
   Prostatitis Unspecified                     

 

 2012            TURNER HANSON, MALDONADO                         601.9         
   Prostatitis Unspecified                     

 

 2012            TURNER HANSON, MALDONADO                         601.9         
   Prostatitis Unspecified                     

 

 2012            TURNER HANSON, MALDONADO                         601.9         
   Prostatitis Unspecified                     

 

 2012            TURNER HANSON, MALDONADO                         601.9         
   PROSTATITIS UNSPECIFIED                     

 

 2013                                      600.01            HYPERTROPHY (
BENIGN) OF PROSTATE WITH URINARY OBSTRUCTION AND OTHER LOWER URINARY TRACT 
SYMPTOMS (LUTS)                     

 

 2013                                      600.01            HYPERTROPHY (
BENIGN) OF PROSTATE WITH URINARY OBSTRUCTION AND OTHER LOWER URINARY TRACT 
SYMPTOMS (LUTS)                     

 

 2013            MALDONADO TOMPKINS MD                         600.01        
    HYPERTROPHY (BENIGN) OF PROSTATE WITH URINARY OBSTRUCTION AND OTHER LOWER 
URINARY TRACT SYMPTOMS (LUTS)                     

 

 2013            MALDONADO TOMPKINS MD                         600.01        
    HYPERTROPHY (BENIGN) OF PROSTATE WITH URINARY OBSTRUCTION AND OTHER LOWER 
URINARY TRACT SYMPTOMS (LUTS)                     

 

 2013            MALDONADO TOMPKINS MD                         600.01        
    HYPERTROPHY (BENIGN) OF PROSTATE WITH URINARY OBSTRUCTION AND OTHER LOWER 
URINARY TRACT SYMPTOMS (LUTS)                     

 

 2013            MALDONADO TOMPKINS MD                         600.01        
    HYPERTROPHY (BENIGN) OF PROSTATE WITH URINARY OBSTRUCTION AND OTHER LOWER 
URINARY TRACT SYMPTOMS (LUTS)                     

 

 2013            MALDONADO TOMPKINS MD                         600.01        
    HYPERTROPHY (BENIGN) OF PROSTATE WITH URINARY OBSTRUCTION AND OTHER LOWER 
URINARY TRACT SYMPTOMS (LUTS)                     

 

 2013            MALDONADO TOMPKINS MD                         600.01        
    HYPERTROPHY (BENIGN) OF PROSTATE WITH URINARY OBSTRUCTION AND OTHER LOWER 
URINARY TRACT SYMPTOMS (LUTS)                     

 

 2013            MALDONADO TOMPKINS MD                         786.05        
    SHORTNESS OF BREATH                     

 

 2013            MALDONADO TOMPKINS MD                         786.05        
    SHORTNESS OF BREATH                     

 

 2013            MALDONADO TOMPKINS MD                         786.05        
    SHORTNESS OF BREATH                     

 

 2013            MALDONADO TOMPKINS MD                         786.05        
    SHORTNESS OF BREATH                     

 

 2013            MALDONADO TOMPKINS MD                         786.05        
    SHORTNESS OF BREATH                     

 

 2013            MALDONADO TOMPKINS MD                         786.05        
    SHORTNESS OF BREATH                     

 

 2013            MALDONADO TOMPKINS MD                         250.02        
    DIABETES MELLITUS WITHOUT MENTION OF COMPLICATION TYPE II OR UNSPECIFIED 
TYPE UNCONTROLLED                     

 

 2013            MALDONADO TOMPKINS MD                         496            
CHRONIC AIRWAY OBSTRUCTION NOT ELSEWHERE CLASSIFIED                     

 

 2013            MALDONADO TOMPKINS MD                         250.02        
    DIABETES MELLITUS WITHOUT MENTION OF COMPLICATION TYPE II OR UNSPECIFIED 
TYPE UNCONTROLLED                     

 

 2013            MALDONADO TOMPKINS MD                         496            
CHRONIC AIRWAY OBSTRUCTION NOT ELSEWHERE CLASSIFIED                     

 

 2013            MALDONADO TOMPKINS MD                         250.02        
    DIABETES MELLITUS WITHOUT MENTION OF COMPLICATION TYPE II OR UNSPECIFIED 
TYPE UNCONTROLLED                     

 

 2013            MALDONADO TOMPKINS MD                         496            
CHRONIC AIRWAY OBSTRUCTION NOT ELSEWHERE CLASSIFIED                     

 

 2013            MALDONADO TOMPKINS MD                         250.02        
    DIABETES MELLITUS WITHOUT MENTION OF COMPLICATION TYPE II OR UNSPECIFIED 
TYPE UNCONTROLLED                     

 

 2013            MALDONADO TOMPKINS MD                         496            
CHRONIC AIRWAY OBSTRUCTION NOT ELSEWHERE CLASSIFIED                     

 

 2013            MALDONADO TOMPKINS MD                         250.02        
    DIABETES MELLITUS WITHOUT MENTION OF COMPLICATION TYPE II OR UNSPECIFIED 
TYPE UNCONTROLLED                     

 

 2013            MALDONADO TOMPKINS MD                         496            
CHRONIC AIRWAY OBSTRUCTION NOT ELSEWHERE CLASSIFIED                     

 

 2014            MALDONADO TOMPKINS MD                         723.1         
   CERVICALGIA                     

 

 2014            MALDONADO TOMPKINS MD                         723.1         
   CERVICALGIA                     

 

 2018            TAWIL MD, ELIAS A            Ot            598.9        
    URETHRAL STRICTURE NOS                     

 

 2018            TAWIL MD, ELIAS A            Ot            598.9        
    URETHRAL STRICTURE NOS                     

 

 2018            ARLET BAPTISTE MD            Ot            I10         
   ESSENTIAL (PRIMARY) HYPERTENSION                     

 

 2018            ARLET BAPTISTE MD            Ot            K21.9       
     GASTRO-ESOPHAGEAL REFLUX DISEASE WITHOUT                     

 

 2018            ARLET BAPTISTE MD            Ot            M54.5       
     LOW BACK PAIN                     

 

 2018            ARLET BAPTISTE MD            Ot            Z79.52      
      LONG TERM (CURRENT) USE OF SYSTEMIC STER                     

 

 2018            VINAY BAPTISTE MDUS J            Ot            Z85.038     
       PERSONAL HISTORY OF MALIGNANT NEOPLASM O                     

 

 2018            VINAY BAPTISTE MDUS J            Ot            Z87.891     
       PERSONAL HISTORY OF NICOTINE DEPENDENCE                     

 

 2018            VINAY BAPTISTE MDUS J            Ot            Z90.49      
      ACQUIRED ABSENCE OF OTHER SPECIFIED PART                     

 

 2018            ARELT BAPTISTE MD J            Ot            I10         
   ESSENTIAL (PRIMARY) HYPERTENSION                     

 

 2018            ARLET BAPTISTE MD            Ot            K21.9       
     GASTRO-ESOPHAGEAL REFLUX DISEASE WITHOUT                     

 

 2018            ARLET BAPTISTE MD J            Ot            M54.5       
     LOW BACK PAIN                     

 

 2018            ARLET BAPTISTE MD J            Ot            Z79.52      
      LONG TERM (CURRENT) USE OF SYSTEMIC STER                     

 

 2018            ARLET BAPTISTE MD J            Ot            Z85.038     
       PERSONAL HISTORY OF MALIGNANT NEOPLASM O                     

 

 2018            VINAY BAPTISTE MDUS J            Ot            Z87.891     
       PERSONAL HISTORY OF NICOTINE DEPENDENCE                     

 

 2018            ARLET BAPTISTE MD            Ot            Z90.49      
      ACQUIRED ABSENCE OF OTHER SPECIFIED PART                     

 

 2018            TAWIL MD, MAYNOR HWANG            Ot            598.9        
    URETHRAL STRICTURE NOS                     

 

 2018            MALDONADO TOMPKINS MD            Ot            K80.20     
       CALCULUS OF GALLBLADDER W/O CHOLECYSTITI                     

 

 2018            MALDONADO TOMPKINS MD            Ot            N18.2      
      CHRONIC KIDNEY DISEASE, STAGE 2 (MILD)                     

 

 2018            MALDONADO TOMPKINS MD            Ot            R16.0      
      HEPATOMEGALY, NOT ELSEWHERE CLASSIFIED                     

 

 2018            MALDONADO TOMPKINS MD            Ot            R59.0      
      LOCALIZED ENLARGED LYMPH NODES                     

 

 2018            MALDONADO TOMPKINS MD            Ot            Z85.038    
        PERSONAL HISTORY OF MALIGNANT NEOPLASM O                     

 

 2018            MALDONADO TOMPKINS MD            Ot            K80.20     
       CALCULUS OF GALLBLADDER W/O CHOLECYSTITI                     

 

 2018            MALDONADO TOMPKINS MD            Ot            N18.2      
      CHRONIC KIDNEY DISEASE, STAGE 2 (MILD)                     

 

 2018            MALDONADO TOMPKINS MD            Ot            R16.0      
      HEPATOMEGALY, NOT ELSEWHERE CLASSIFIED                     

 

 2018            MALDONADO TOMPKINS MD            Ot            R59.0      
      LOCALIZED ENLARGED LYMPH NODES                     

 

 2018            MALDONADO TOMPKINS MD            Ot            Z85.038    
        PERSONAL HISTORY OF MALIGNANT NEOPLASM O                     

 

 2018            TAWIL MD, MAYNOR HWANG            Ot            598.9        
    URETHRAL STRICTURE NOS                     

 

 2018            MALDONADO TOMPKINS MD            Ot            K80.20     
       CALCULUS OF GALLBLADDER W/O CHOLECYSTITI                     

 

 2018            MALDONADO TOMPKINS MD            Ot            N18.2      
      CHRONIC KIDNEY DISEASE, STAGE 2 (MILD)                     

 

 2018            MALDONADO TOMPKINS MD            Ot            R16.0      
      HEPATOMEGALY, NOT ELSEWHERE CLASSIFIED                     

 

 2018            MALDONADO TOMPKINS MD            Ot            R59.0      
      LOCALIZED ENLARGED LYMPH NODES                     

 

 2018            MALDONADO TOMPKINS MD            Ot            Z85.038    
        PERSONAL HISTORY OF MALIGNANT NEOPLASM O                     

 

 2018            KOREY MARTIN DO            Ot            D12.2       
     BENIGN NEOPLASM OF ASCENDING COLON                     

 

 2018            KOREY MARTIN DO            Ot            E11.42      
      TYPE 2 DIABETES MELLITUS WITH DIABETIC P                     

 

 2018            KOREY MARTIN DO            Ot            I10         
   ESSENTIAL (PRIMARY) HYPERTENSION                     

 

 2018            KOREY MARTIN DO            Ot            J44.9       
     CHRONIC OBSTRUCTIVE PULMONARY DISEASE, U                     

 

 2018            KOREY MARTIN DO            Ot            Z79.84      
      LONG TERM (CURRENT) USE OF ORAL HYPOGLYC                     

 

 2018            KOREY MARTIN DO            Ot            Z85.038     
       PERSONAL HISTORY OF MALIGNANT NEOPLASM O                     

 

 2018            KOREY MARTIN DO            Ot            Z87.891     
       PERSONAL HISTORY OF NICOTINE DEPENDENCE                     



                                                                               
                                                                               
                                                                      



Procedures

      





 Code            Description            Performed By            Performed On   
     

 

             78010                                  A1C (IN-HOUSE)             
                      2013        

 

             32407                                  MICRO ALBUMIN-IN HOUSE     
                              2013        

 

             28462                                  MICROALBUMIN               
                    2013        

 

             91999                                  PULMONARY FUNCTION TEST    
                               2013        

 

             82349                                  ROUTINE VENIPUNCTURE       
                            2013        

 

             42505                                  CMP                        
           2013        

 

             60694                                  LIPID PANEL                
                   2013        

 

             [id number]                                  GFR CALC (RESULT ONLY)   
                                2013        

 

             77474                                  SPIROMETRY                 
                  2013        

 

             65714                                  BRONCHODILATION PRE/POST   
                                2013        

 

             36149                                  RESPIRATORY FLOW VOLUME 
LOOP                                   2013        

 

             84446                                  A1C (IN-HOUSE)             
                      2014        

 

             12322                                  ROUTINE VENIPUNCTURE       
                            2014        

 

             8006650                                  GFR CALC (RESULT ONLY)   
                                2014        

 

             79696                                  CMP                        
           2014        

 

             04611                                  LIPID PANEL                
                   2014        

 

             03924                                  ROUTINE VENIPUNCTURE       
                            2014        

 

             95300                                  A1C (IN-HOUSE)             
                      2014        

 

             62433                                  BMP                        
           2014        

 

             7325955                                  GFR CALC (RESULT ONLY)   
                                2014        

 

             99500                                  OXIMETRY                   
                2014        



                                                          



Results

      





 Test            Result            Range        









 Comp. Metabolic Panel (14) - 16 08:17         









 Glucose, Serum            180 mg/dL            65-99        

 

 BUN            10 mg/dL            8-27        

 

 Creatinine, Serum            1.05 mg/dL            0.76-1.27        

 

 eGFR If NonAfricn Am            72 mL/min/1.73                >59        

 

 eGFR If Africn Am            83 mL/min/1.73                >59        

 

 BUN/Creatinine Ratio            10             10-22        

 

 Sodium, Serum            139 mmol/L            134-144        

 

 Potassium, Serum            4.8 mmol/L            3.5-5.2        

 

 Chloride, Serum            97 mmol/L                    

 

 Carbon Dioxide, Total            24 mmol/L            18-29        

 

 Calcium, Serum            9.7 mg/dL            8.6-10.2        

 

 Protein, Total, Serum            6.5 g/dL            6.0-8.5        

 

 Albumin, Serum            4.3 g/dL            3.6-4.8        

 

 Globulin, Total            2.2 g/dL            1.5-4.5        

 

 A/G Ratio            2.0             1.1-2.5        

 

 Bilirubin, Total            0.7 mg/dL            0.0-1.2        

 

 Alkaline Phosphatase, S            77 IU/L                    

 

 AST (SGOT)            25 IU/L            0-40        

 

 ALT (SGPT)            34 IU/L            0-44        









 Lipid Panel - 16 08:17         









 Cholesterol, Total            118 mg/dL            100-199        

 

 Triglycerides            142 mg/dL            0-149        

 

 HDL Cholesterol            25 mg/dL            >39        

 

 VLDL Cholesterol Victor Manuel            28 mg/dL            5-40        

 

 LDL Cholesterol Calc            65 mg/dL            0-99        









 CMP - 18 10:47         









 GLUCOSE            147 mg/dL            65-99        

 

 UREA NITROGEN (BUN)            13 mg/dL            7-25        

 

 CREATININE            1.31 mg/dL            0.70-1.18        

 

 eGFR NON-AFR. AMERICAN            55 mL/min/1.73m2            > OR=60        

 

 eGFR             63 mL/min/1.73m2            > OR=60        

 

 BUN/CREATININE RATIO            10 (calc)            6-22        

 

 SODIUM            137 mmol/L            135-146        

 

 POTASSIUM            4.3 mmol/L            3.5-5.3        

 

 CHLORIDE            100 mmol/L                    

 

 CARBON DIOXIDE            28 mmol/L            20-31        

 

 CALCIUM            9.5 mg/dL            8.6-10.3        

 

 PROTEIN, TOTAL            6.8 g/dL            6.1-8.1        

 

 ALBUMIN            4.5 g/dL            3.6-5.1        

 

 GLOBULIN            2.3 g/dL (calc)            1.9-3.7        

 

 ALBUMIN/GLOBULIN RATIO            2.0 (calc)            1.0-2.5        

 

 BILIRUBIN, TOTAL            0.7 mg/dL            0.2-1.2        

 

 ALKALINE PHOSPHATASE            81 U/L                    

 

 AST            19 U/L            10-35        

 

 ALT            24 U/L            9-46        









 LIPID PANEL - 18 08:19         









 CHOLESTEROL, TOTAL            106 mg/dL            <200        

 

 HDL CHOLESTEROL            29 mg/dL            >40        

 

 TRIGLYCERIDES            118 mg/dL            <150        

 

 LDL-CHOLESTEROL            57 mg/dL (calc)            NRG        

 

 CHOL/HDLC RATIO            3.7 (calc)            <5.0        

 

 NON HDL CHOLESTEROL            77 mg/dL (calc)            <130        









 PSA - 18 08:19         









 PSA, TOTAL            0.5 ng/mL            < OR=4.0        









 Serum or plasma urea nitrogen measurement (mass/volume) - 18 09:23      
   









 Serum or plasma urea nitrogen measurement (mass/volume)            16 mg/dL   
         7-18        









 Serum or plasma creatinine measurement with calculation of estimated 
glomerular filtration rate - 18 09:23         









 Serum or plasma creatinine measurement (mass/volume)            1.44 mg/dL    
        0.60-1.30        

 

 Serum or plasma creatinine measurement with calculation of estimated 
glomerular filtration rate            48             NRG        



                            



Encounters

      





 ACCT No.            Visit Date/Time            Discharge            Status    
        Pt. Type            Provider            Facility            Loc./Unit  
          Complaint        

 

 [id number]            2014 13:53:00            2014 23:59:59          
  [facility]            Outpatient            MALDONADO TOMPKINS MD                       
                        

 

 [id number]            2014 13:53:00            2014 23:59:59          
  [facility]            Outpatient            MALDONADO TOMPKINS MD                       
                        

 

 [id number]            2014 08:33:00            2014 23:59:59          
  [facility]            Outpatient            MALDONADO TOMPKINS MD                       
                        

 

 [id number]            2014 14:41:00            2014 23:59:59          
  CLS            Outpatient            MALDONADO TOMPKINS MD                       
                        

 

 [id number]            2013 10:41:00            2013 23:59:59          
  CLS            Outpatient            MALDONADO TOMPKINS MD                       
                        

 

 [id number]            2013 07:45:00            2013 23:59:59          
  CLS            Outpatient            MALDONADO TOMPKINS MD                       
                        

 

 [id number]            2013 10:13:00            2013 23:59:59          
  CLS            Outpatient                                                    
        

 

 [id number]            2013 10:13:00            2013 23:59:59          
  CLS            Outpatient                                                    
        

 

 [id number]            2012 10:32:00            2012 23:59:59            
CLS            Outpatient            MALDONADO TOMPKINS MD                         
                      

 

 [id number]            2012 10:32:00            2012 23:59:59          
  CLS            Outpatient                                                    
        

 

 [id number]            2016 08:06:00                                   
   Document Registration                                                       
     

 

 [id number]            2018 12:21:00            2018 17:25:00    
        DIS            Outpatient            KOREY MARTIN DO            [facility] 
[facility]            ENDO            RECTAL BLEEDING,MASS IN 
ASCENDING COLON        

 

 [id number]            2018 09:08:00            2018 23:59:59    
        CLS            Outpatient            MALDONADO TOMPKINS MD            [facility] 
[facility]            RAD            RETROPERITONEAL 
LYMPHADENOPATHY        

 

 [id number]            2018 23:18:00            2018 00:53:00    
        DIS            Emergency            ARLET BAPTISTE MD            [facility] 
[facility]            ER            BACK PAIN        

 

 [id number]            2018 08:15:00            2018 23:59:59    
        CLS            Preadmit            MALDONADO TOMPKINS MD            [facility] 
[facility]            RAD            NICOTINE DEPENDENCE 
Z87.891        

 

 [id number]            2013 08:31:00            2013 23:59:59    
        CLS            Outpatient            TAWIL MD, ELIAS A            [facility] 
[facility]            RAD            STRICTURE        

 

 [id number]            2018 10:34:00                                   
   Document Registration                                                       
     

 

 [id number]            2018 13:40:00            2018 23:59:59          
  CLS            Outpatient            MALDONADO TOMPKINS MD                       
  [facility]                     

 

 [id number]            2018 08:40:00                                      
Document Registration                                                          
  

 

 [id number]            2018 10:20:00                                      
Document Registration

## 2018-08-14 NOTE — XMS REPORT
[facility]

 Created on: 2018



Eddi Sesay

External Reference #: [id number]

: 1945

Sex: Male



Demographics







 Address  [address] [place] [address] [place]  [address]

 

 Preferred Language  Unknown

 

 Marital Status  Unknown

 

 Shinto Affiliation  Unknown

 

 Race  Unknown

 

 Ethnic Group  Unknown





Author







 Author  MALDONADO TOMPKINS

 

 [facility]

 

 Address  [address] [place]  [address]



 

 Phone  [phone]







Care Team Providers







 Care Team Member Name  Role  Phone

 

 MALDONADO TOMPKINS  Unavailable  [phone]







PROBLEMS







 Type  Condition  ICD9-CM Code  UDB37-WT Code  Onset Dates  Condition Status  
SNOMED Code

 

 Problem  Chronic kidney disease     N18.9     Active  938254607

 

 Problem  COPD (chronic obstructive pulmonary disease)     J44.9     Active  
46735393

 

 Problem  Hyperlipidemia     E78.5     Active  71791115

 

 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 
lower urinary tract symptoms [LUTS]  600.01        Active  657578261

 

 Problem  Other chronic pain     G89.29     Active  36365206

 

 Problem  Low back pain     M54.5     Active  773656001

 

 Problem  BPH NOS w ur obs/LUTS     N40.1     Active  119223615

 

 Problem  Hypertension     I10     Active  67699241

 

 Problem  Type 2 diabetes mellitus with diabetic chronic kidney disease     
E11.22     Active  39423162

 

 Problem  Chronic kidney disease, stage II (mild)     N18.2     Active  
[id number]







ALLERGIES







 Substance  Reaction  Event Type  Date  Status

 

 Morphine  Unknown  Drug Allergy  14 May, 2018  Active







ENCOUNTERS







 Encounter  Location  Date  Diagnosis

 

 [facility]  [address] [place] [address] 28157-
4881    Colonic mass K63.9

 

 [facility]  [id number] [place] [address] 09155-
3816     

 

 [facility]  [id number] [place] [address] 69161-
9746  10 Jul, 2018  Chronic kidney disease, stage II (mild) N18.2

 

 [facility]  [id number] [place] [address] 34171-
4536    Low back pain M54.5 ; Other chronic pain G89.29 and 
Retroperitoneal lymphadenopathy R59.0

 

 [facility]  [address] [place] [address]-
0144  16 May, 2018  Medicare annual wellness visit, initial Z00.00 ; 
Hyperlipidemia E78.5 ; Type 2 diabetes mellitus with diabetic chronic kidney 
disease E11.22 ; COPD (chronic obstructive pulmonary disease) J44.9 ; Chronic 
kidney disease, stage II (mild) N18.2 ; Hypertension I10 ; BPH NOS w ur obs/
LUTS N40.1 and History of smoking Z87.891

 

 [facility]  [id number] [place] [address] 44210-
6624  14 May, 2018  Medicare annual wellness visit, initial Z00.00 ; Type 2 
diabetes mellitus with diabetic chronic kidney disease E11.22 ; COPD (chronic 
obstructive pulmonary disease) J44.9 ; Chronic kidney disease, stage II (mild) 
N18.2 ; Hypertension I10 ; Hyperlipidemia E78.5 ; BPH NOS w ur obs/LUTS N40.1 
and History of smoking Z87.891

 

 [facility] [id number] [place] [address] 66499-
9040  20 Mar, 2018  BPH NOS w ur obs/LUTS N40.1 ; COPD (chronic obstructive 
pulmonary disease) J44.9 ; Hyperlipidemia E78.5 ; Hypertension I10 ; Type 2 
diabetes mellitus with diabetic chronic kidney disease E11.22 and Chronic 
kidney disease, stage II (mild) N18.2

 

 [facility] [address] [address]-
9507  19 Mar, 2018  Diabetes E11.9

 

 [facility] [id number] [place] [address] 95037-
9900  15 Aug, 2017  COPD (chronic obstructive pulmonary disease) J44.9

 

 [facility] [address] [address]-
8764  01 Aug, 2017  Hyperlipidemia E78.5

 

 [facility] [id number] [place] [address] 96489-
1346     

 

 [facility] [address] [place] [address] 20419-
5898     

 

 [facility] [id number] [address]-
0975    Diabetes E11.9

 

 [facility]  [address] [place] [address] 74210-
9181    Diabetes E11.9

 

 [facility]  [id number] [place] [address] 65327-
2339    Hypertension I10 ; Diabetes E11.9 and COPD (chronic 
obstructive pulmonary disease) J44.9

 

 [facility]  [id number] [place] [address] 34843-
1585    Diabetes E11.9 and Hypertension I10

 

 [facility]  [id number] [place] [address] 37282-
1033  13 Oct, 2016   

 

 [facility]  [id number] [place] [address] 05133-
8775  28 Sep, 2016  Hyperlipidemia E78.5

 

 [facility]  [id number] [place] [address] 13073-
5334  27 Sep, 2016  Diabetes E11.9 and Hyperlipidemia E78.5

 

 [facility]  [id number] [place] [address] 06115-
5465    Chronic kidney disease N18.9

 

 [facility]  [id number] [place] [address] 40918-
6181    Diabetes E11.9 ; Hypertension I10 ; Hyperlipidemia E78.5 
and COPD (chronic obstructive pulmonary disease) J44.9

 

 [facility]  [address] [address]-
1665  06 Oct, 2015   

 

 [facility]  [address] [place] [address] 59742-
5185  06 Oct, 2015  Altered metabolism due to diabetes E11.9

 

 [facility]  [address] [address]-
6611  06 Oct, 2015  Chronic kidney disease N18.9

 

 [facility]  [address] [place] [address] 40051-
4707  05 Oct, 2015  Chronic kidney disease N18.9 and Back pain M54.9

 

 [facility]  [address] [place] [address] 151347-
8105  24 Sep, 2015  Abdominal pain 789.00

 

 [facility]  [id number] [place] [address] 553901-
2412  08 Sep, 2015  Other and unspecified hyperlipidemia 272.4

 

 [facility]  [id number] [place] [address] 272610-
1495  03 Sep, 2015  Diabetes mellitus without mention of complication, type II 
or unspecified type, not stated as uncontrolled 250.00

 

 [facility]  [id number] [place] [address] 180719-
8991  28 Aug, 2015  Diabetes mellitus without mention of complication, type II 
or unspecified type, not stated as uncontrolled 250.00

 

 [facility]  [id number] [place] [address] 24128-
6412    Diabetes mellitus without mention of complication, type II 
or unspecified type, uncontrolled 250.02 ; Diabetes mellitus without mention of 
complication, type II or unspecified type, not stated as uncontrolled 250.00 
and Acute sinusitis 461.9

 

 [facility]  [id number] [place] [address] 63293-
0753     

 

 [facility]  [address] [place] [address] 95990-
7669     

 

 [facility]  [id number] [place] [address] 667362-
6842     

 

 [facility]  [address] [place] [address] 41906-
2846     

 

 [facility]  [id number] [place] [address] 81497-
3496  12 Dec, 2014   

 

 [facility]  [id number] [place] [address] 336909-
6436  12 Dec, 2014   

 

 [facility]  [id number] [place] [address] 052178-
6088  08 Dec, 2014   

 

 [facility] [place] [facility]  [address] [id number] [place] [address]-
2882  08 Dec, 2014   

 

 [facility] [place] [facility]  [address] [id number] [place] [address]-
4666  08 Dec, 2014   

 

 [facility] [place] [facility]  [address] [id number] [place] [address]-
4173  08 Dec, 2014   

 

 [facility] [place] [facility]  [address] [id number] [place] [address]-
5392     

 

 [facility] [place] [facility]  [address] [id number] [place] [address]-
2169     

 

 [facility] [place] [facility]  [address] [id number] [place] [address]-
0958  10 Oct, 2014   

 

 [facility] [place] [facility]  [address] [id number] [place] [address]-
1039  10 Oct, 2014   

 

 [facility] [place] [facility]  [address] [id number] [place] [address]-
6400  07 Oct, 2014   

 

 [facility] [place] [facility]  [address] [id number] [place] [address]-
0185  07 Oct, 2014   

 

 [facility] [place] [facility]  [address] [id number] [place] [address]-
3113  04 Aug, 2014   

 

 [facility] [place] [facility]  [address] [id number] [place] [address]-
2738  04 Aug, 2014   

 

 [facility] [place] [facility]  [address] [id number] [place] [address]-
4079  05 May, 2014   

 

 [facility] [place] [facility]  [address] [id number] [place] [address]-
2138  05 May, 2014   

 

 [facility] [place] [facility]  [address] [id number] [place] [address]-
1503     

 

 [facility] [place] [facility]  [address] [id number] [place] [address]-
2830     

 

 [facility] [place] [facility]  [address] [id number] [place] [address]-
6292     

 

 [facility] [place] [facility]  [address] [id number] [place] [address]-
6804     

 

 [facility] [place] [facility]  [address] [id number] [place]-
7898     

 

 [facility] [place] [facility]  [address] [id number] [place] [address]-
8227     

 

 [facility] [place] [facility]  [address] [id number] [place] [address]-
4961     

 

 [facility] [place] [facility]  [address] [id number] [place]-
7633     

 

 [facility] [place] [facility]  [address] [id number] [place] [address]-
9777     

 

 [facility] [place] [facility]  [address] [id number] [place] [address]-
3265     

 

 [facility] [place] [facility]  [address] [id number] [place] [address]-
1318  31 Mar, 2014   

 

 [facility] [place] [facility]  [address] [id number] [place] [address]-
1384  31 Mar, 2014   

 

 [facility] [place] [facility]  [address] [id number] [place] [address]-
7031  05 Mar, 2014   

 

 [facility] [place] [facility]  [address] [id number] [place] [address]-
5496  05 Mar, 2014   

 

 [facility] [place] [facility]  [address] [id number] [place] [address]-
4502     

 

 [facility] [place] [facility]  [address] [id number] [place] [address]-
1812     

 

 [facility] [place] [facility]  [address] [id number] [place] [address]-
9595     

 

 [facility] [place] [facility]  [address] [id number] [place] [address]-
0140     

 

 [facility] [place] [facility]  [address] [id number] [place] [address]-
8977     

 

 [facility]  [address] [id number] [place] [address]-
1463     

 

 [facility] [place] [facility]  [address] [id number] [place] [address]-
7594     

 

 [facility] [place] [facility]  [address] [id number] [place] [address]-
0298     

 

 [facility] [place] [facility]  [address] [id number] [place] [address]-
7230  13 Aug, 2013   

 

 [facility] [place] [facility]  [address] [id number] [place] [address]-
4619     

 

 [facility] [place] [facility]  [address] [id number] [place] [address]-
5843     

 

 [facility] [place] [facility]  [address] [id number] [place] [address]-
7109  15 Jul, 2013   

 

 [facility] [place] [facility]  [address] [id number] [place] [address]-
5309     

 

 [facility] [place] [facility]  [address] [id number] [place] [address]-
0188     

 

 [facility] [place] [facility]  [address] [id number] [place] [address]-
7730  07 Mar, 2013   

 

 [facility] [place] [facility]  [address] [id number] [place] [address]-
5011     

 

 [facility] [place] [facility]  [address] [id number] [place] [address]-
1800     

 

 [facility] [place] [facility]  [address] [id number] [place] [address]-
7001     

 

 [facility] [place] [facility]  [address] [id number] [place] [address]-
6265  15 Khurram, 2013   

 

 [facility] [place] [facility]  [address] [id number] [place] [address]-
0439     

 

 [facility]  [address] [address]-
5737     

 

 [facility]  [address] [address]-
7501     

 

 [facility]  [address] [address]-
8346     

 

 [facility]  [address] [address]-
2941  13 Sep, 2012   

 

 [facility]  [address] [address]-
1419  22 May, 2012   

 

 [facility]  [address] [address]-
9899     

 

 [facility]  [address] [address]-
6067     

 

 [facility]  [address] [address]-
0262     

 

 [facility]  [address] [address]-
2291     

 

 [facility]  [address] [address]-
0548     

 

 [facility]  [address] [address]-
3653     







IMMUNIZATIONS

No Known Immunizations



SOCIAL HISTORY

Never Assessed



REASON FOR VISIT

Medicare AWV - Initial Visit- CJ Seymour RN



PLAN OF CARE







 Activity  Details









  









 Follow Up  1 Year Reason:







VITAL SIGNS







 Height  70 in  2018

 

 Weight  205 lbs  2018

 

 Temperature  98.0 degrees Fahrenheit  2018

 

 Heart Rate  80 bpm  2018

 

 Respiratory Rate  18   2018

 

 BMI  29.41 kg/m2  2018

 

 Blood pressure systolic  134 mmHg  2018

 

 Blood pressure diastolic  78 mmHg  2018







MEDICATIONS







 Medication  Instructions  Dosage  Frequency  Start Date  End Date  Duration  
Status

 

 Metformin HCl 1000MG  Orally 2 times a day  1 tablet  12h        90 days  
Active

 

 Lipitor 10MG  Orally Once a day  1 tablet  24h           Active

 

 Trulicity 1.5 MG/0.5ML  Subcutaneous once weekly  0.5 ml     27 Sep, 2016  14 
Mar, 2019  90 days  Active

 

 GlipiZIDE 5MG     TAKE ONE TABLET BY MOUTH ONCE DAILY              Active

 

 Hydrochlorothiazide 25MG  Orally Once a day  1 tablet  24h           Active

 

 Finasteride 5MG  Orally Once a day  1 tablet  24h           Active

 

 Lisinopril 20MG     TAKE ONE TABLET BY MOUTH ONCE DAILY              Active







RESULTS

No Results



PROCEDURES







 Procedure  Date Ordered  Result  Body Site

 

 FQ VISIT IPPE/AWV  May 14, 2018      

 

 ANNUAL AMY VST; PERSNL PPS INIT  May 14, 2018      

 

 PT TOBACCO SCREEN RCVD TLK  May 14, 2018      

 

 FALL RISK ASSESSMENT DOCD  May 14, 2018      

 

 NEG SCR D PT NOT ELIG F/U/PLN DOC  May 14, 2018      







INSTRUCTIONS





MEDICATIONS ADMINISTERED

No Known Medications



MEDICAL (GENERAL) HISTORY







 Type  Description  Date

 

 Medical History  Type II diabetic   

 

 Medical History  hypertension   

 

 Surgical History  colon surgery   

 

 Surgical History  scar tissue removal   

 

 Surgical History  bilateral cataract surgeries   

 

 Hospitalization History  surgery   

 

 Hospitalization History  [facility] ER - Back pain

## 2018-08-14 NOTE — ED GENERAL
General


Chief Complaint:  Glucose Problems


Stated Complaint:  BS HIGH, OVER 600


Nursing Triage Note:  


Pt ambulated to rm 3 w/o difficulty.  Pt states he is having his colon removed 


on Thursday due to stage 4 metastatic colon cancer.  Pt had pre-op today and 

was 


told his blood sugar was high and he needed to get blood sugar under control 


before surgery on Thursday.  Pt states blood sugar was 600+ earlier today, 547 


at home before coming to ED.  Pt's BS at assessment was 340.  Pt states he is 


prescribed trulicty, but has not taken it for two months.


Nursing Sepsis Screen:  No Definite Risk





History of Present Illness


Date Seen by Provider:  Aug 14, 2018


Time Seen by Provider:  21:15


Initial Comments


Patient is a 73-year-old male presents to the emergency room with complaints of 

having elevated blood sugars today. He had outpatient lab work for a colon 

resection that is scheduled for 8/16/18 today and they called him and told him 

that he had elevated blood sugars. His wife is a diabetic and she checked his 

sugar and stated that it was around 600 today and slowly started to come down. 

On arrival to the emergency room his blood sugar was 340. He is a known 

diabetic and is prescribed Trulisity has not taken it for 2 months. His PCP is 

Dr. Christianson


Timing/Duration:  1 Day


Associated Systoms:  Denies Symptoms





Allergies and Home Medications


Allergies


Coded Allergies:  


     morphine (Verified  Adverse Reaction, Unknown, itching, 8/14/18)





Home Medications


Atorvastatin Calcium 10 Mg Tablet, 10 MG PO DAILY, (Reported)


Finasteride 5 Mg Tablet, 5 MG PO DAILY, (Reported)


Glipizide 5 Mg Tablet, 5 MG PO DAILY, (Reported)


Hydrochlorothiazide 25 Mg Tablet, 25 MG PO DAILY, (Reported)


Lisinopril 20 Mg Tablet, 20 MG PO DAILY, (Reported)


Metformin HCl 1,000 Mg Tablet, 1,000 MG PO BID, (Reported)





Patient Home Medication List


Home Medication List Reviewed:  Yes





Review of Systems


Constitutional:  see HPI; No fever


Skin:  see HPI; No change in color, No change in hair/nails


All Other Systems Reviewed


Negative Unless Noted:  Yes





Past Medical-Social-Family Hx


Past Med/Social Hx:  Reviewed Nursing Past Med/Soc Hx


Patient Social History


Alcohol Use:  Denies Use


Recreational Drug Use:  No


Smoking Status:  Former Smoker


Type Used:  Cigarettes


Former Smoker, Quit:  Jul 24, 2008


2nd Hand Smoke Exposure:  No


Recent Foreign Travel:  No


Contact w/Someone Who Travel:  No


Recent Infectious Disease Expo:  No


Recent Hopitalizations:  No


Physical Abuse:  No


Sexual Abuse:  No





Immunizations Up To Date


Tetanus Booster (TDap):  Unknown





Seasonal Allergies


Seasonal Allergies:  No





Past Medical History


Surgeries:  Yes (Colon resection, scar tissue removed from urethea)


Respiratory:  Yes


COPD


Currently Using CPAP:  No


Currently Using BIPAP:  No


Cardiac:  No


Hypertension


Neurological:  No


Reproductive Disorders:  No


Sexually Transmitted Disease:  No


HIV/AIDS:  No


Genitourinary:  No


Gastrointestinal:  Yes (right colon mass)


Gastroesophageal Reflux


Musculoskeletal:  Yes


Arthritis


Endocrine:  Yes


Diabetes, Non-Insulin dep


HEENT:  Yes (cataracts removed, )


Cancer:  Yes


Colon


Psychosocial:  No


Nursing Suicide Risk Score:  0


Integumentary:  No


Blood Disorders:  No





Family Medical History


Reviewed Nursing Family Hx





Alcoholism


  G8 BROTHER


Colon cancer


  19 MOTHER


  G8 BROTHER


Diabetes mellitus


  19 MOTHER


  G8 BROTHER


Hypertension


  19 MOTHER


  G8 BROTHER


  G8 SISTER


Myocardial infarction


  G8 BROTHER


Respiratory disorder


  19 MOTHER





Physical Exam


Vital Signs





Vital Signs - First Documented








 8/14/18





 21:04


 


Temp 98.9


 


Pulse 79


 


Resp 12


 


B/P (MAP) 140/79 (99)


 


Pulse Ox 98


 


O2 Delivery Room Air





Capillary Refill : Less Than 3 Seconds


Height, Weight, BMI


Height: 5'9.00"


Weight: 194lbs. 8.0oz. 87.484901ot; 27.9 BMI


Method:Stated


General Appearance:  No Apparent Distress, WD/WN


Respiratory:  Chest Non Tender, Lungs Clear, Normal Breath Sounds, No Accessory 

Muscle Use, No Respiratory Distress


Cardiovascular:  Regular Rate, Rhythm, No Edema, No Gallop, No JVD, No Murmur, 

Normal Peripheral Pulses


Neurologic/Psychiatric:  Alert, Oriented x3, No Motor/Sensory Deficits


Skin:  Normal Color, Warm/Dry





Progress/Results/Core Measures


Suspected Sepsis


Recent Fever Within 48 Hours:  No


Infection Criteria Present:  None


New/Unexplained  Altered Menta:  No


Sepsis Screen:  No Definite Risk


SIRS


Temperature:98.9 


Pulse: 79 


Respiratory Rate: 12


 


Laboratory Tests


8/14/18 21:14: White Blood Count 12.4H


Blood Pressure 140 /79 


Mean: 99


 


Laboratory Tests


8/14/18 21:14: 


Creatinine 1.41H, Platelet Count 307, Total Bilirubin 0.5








Results/Orders


Lab Results





Laboratory Tests








Test


 8/14/18


22:45 Range/Units


 


 


Glucometer 240 H   MG/DL








My Orders





Orders - EAGLE KEITH


Insulin (Regular) Human (Humulin R (Per (8/14/18 22:00)


Ua Culture If Indicated (8/14/18 22:05)





Medications Given in ED





Vital Signs/I&O


Capillary Refill : Less Than 3 Seconds








Blood Pressure Mean:  99











Point of Care Testing


Finger Stick Blood Glucose:  240


Blood Glucose Action Taken:  Eagle Keith notified


Progress Note :  


   Time:  22:58


Progress Note


I have seen and evaluated the patient. His blood sugar is 240 after 

administration of insulin. The patient agrees with plans for discharge, close 

follow-up with his doctor. Return precautions were given.





Departure


Impression





 Primary Impression:  


 Diabetes mellitus


 Additional Impression:  


 Uncontrolled type 2 diabetes mellitus


Disposition:  01 HOME, SELF-CARE


Condition:  Stable/Unchanged





Departure-Patient Inst.


Decision time for Depature:  23:01


Referrals:  


MALDONADO TOMPKINS MD (PCP/Family)


Primary Care Physician


Patient Instructions:  Diabetes Type 2 (DC)





Add. Discharge Instructions:  


Take your medications as previously prescribed. Follow up with Dr. Salazar 

tomorrow for recheck. Return back to the emergency room for any worsening 

symptoms or any other concerns as needed. All discharge instructions reviewed 

with patient and/or family. Voiced understanding.











EAGLE KEITH Aug 14, 2018 23:01

## 2018-08-14 NOTE — XMS REPORT
[facility]

 Created on: 2018



Eddi Sesay

External Reference #: [id number]

: 1945

Sex: Male



Demographics







 Address  [address] [place] [address] [place]  [address]

 

 Preferred Language  Unknown

 

 Marital Status  Unknown

 

 Mosque Affiliation  Unknown

 

 Race  Unknown

 

 Ethnic Group  Unknown





Author







 Author  MALDONADO TOMPKINS

 

 [facility]

 

 Address  [address] [place]  [address]



 

 Phone  [phone]







Care Team Providers







 Care Team Member Name  Role  Phone

 

 MALDONADO TOMPKINS  Unavailable  [phone]







PROBLEMS







 Type  Condition  ICD9-CM Code  BLG41-NA Code  Onset Dates  Condition Status  
SNOMED Code

 

 Problem  Chronic kidney disease     N18.9     Active  042870740

 

 Problem  COPD (chronic obstructive pulmonary disease)     J44.9     Active  
20321380

 

 Problem  Hyperlipidemia     E78.5     Active  25540427

 

 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 
lower urinary tract symptoms [LUTS]  600.01        Active  094928125

 

 Problem  Other chronic pain     G89.29     Active  99496534

 

 Problem  Low back pain     M54.5     Active  954178666

 

 Problem  BPH NOS w ur obs/LUTS     N40.1     Active  121528746

 

 Problem  Hypertension     I10     Active  12660401

 

 Problem  Type 2 diabetes mellitus with diabetic chronic kidney disease     
E11.22     Active  68772439

 

 Problem  Chronic kidney disease, stage II (mild)     N18.2     Active  
136907614







ALLERGIES







 Substance  Reaction  Event Type  Date  Status

 

 Morphine  Unknown  Drug Allergy  20 Mar, 2018  Active







ENCOUNTERS







 Encounter  Location  Date  Diagnosis

 

 [facility]  [address] [place] [address]873-
4391    Colonic mass K63.9

 

 [facility]  [id number] [place] [address] 15222-
4415     

 

 [facility]  [id number] [place] [address] 64221-
2896  10 Jul, 2018  Chronic kidney disease, stage II (mild) N18.2

 

 [facility]  [id number] [place] [address] 47262-
0936    Low back pain M54.5 ; Other chronic pain G89.29 and 
Retroperitoneal lymphadenopathy R59.0

 

 [facility]  [address] [place] [address]-
6979  16 May, 2018  Medicare annual wellness visit, initial Z00.00 ; 
Hyperlipidemia E78.5 ; Type 2 diabetes mellitus with diabetic chronic kidney 
disease E11.22 ; COPD (chronic obstructive pulmonary disease) J44.9 ; Chronic 
kidney disease, stage II (mild) N18.2 ; Hypertension I10 ; BPH NOS w ur obs/
LUTS N40.1 and History of smoking Z87.891

 

 [facility]  [id number] [place] [address] 65747-
3278  14 May, 2018  Medicare annual wellness visit, initial Z00.00 ; Type 2 
diabetes mellitus with diabetic chronic kidney disease E11.22 ; COPD (chronic 
obstructive pulmonary disease) J44.9 ; Chronic kidney disease, stage II (mild) 
N18.2 ; Hypertension I10 ; Hyperlipidemia E78.5 ; BPH NOS w ur obs/LUTS N40.1 
and History of smoking Z87.891

 

 [place] [id number] [place] [address] 63818-
0186  20 Mar, 2018  BPH NOS w ur obs/LUTS N40.1 ; COPD (chronic obstructive 
pulmonary disease) J44.9 ; Hyperlipidemia E78.5 ; Hypertension I10 ; Type 2 
diabetes mellitus with diabetic chronic kidney disease E11.22 and Chronic 
kidney disease, stage II (mild) N18.2

 

 [place] [address] [address]-
9431  19 Mar, 2018  Diabetes E11.9

 

 [place] [id number] [place] [address] 26282-
8413  15 Aug, 2017  COPD (chronic obstructive pulmonary disease) J44.9

 

 [place] [address] [address]-
7475  01 Aug, 2017  Hyperlipidemia E78.5

 

 [place] [id number] [place] [address] 51935-
7050     

 

 [place] [address] [place] [address] 13610-
3838     

 

 [facility] [id number] [address]-
0536    Diabetes E11.9

 

 [facility]  [address] [place] [address] 84483-
8613    Diabetes E11.9

 

 [facility]  [id number] [place] [address] 80615-
3255    Hypertension I10 ; Diabetes E11.9 and COPD (chronic 
obstructive pulmonary disease) J44.9

 

 [facility]  [id number] [place] [address] 97704-
9632    Diabetes E11.9 and Hypertension I10

 

 [facility]  [id number] [place] [address] 04850-
5502  13 Oct, 2016   

 

 [facility]  [id number] [place] [address] 67182-
2001  28 Sep, 2016  Hyperlipidemia E78.5

 

 [facility]  [id number] [place] [address] 14239-
9782  27 Sep, 2016  Diabetes E11.9 and Hyperlipidemia E78.5

 

 [facility]  [id number] [place] [address] 92344-
0201    Chronic kidney disease N18.9

 

 [facility]  [id number] [place] [address] 41116-
9986    Diabetes E11.9 ; Hypertension I10 ; Hyperlipidemia E78.5 
and COPD (chronic obstructive pulmonary disease) J44.9

 

 [facility]  [address] [address]-
9975  06 Oct, 2015   

 

 [facility]  [address] [place] [address] 40417-
1746  06 Oct, 2015  Altered metabolism due to diabetes E11.9

 

 [facility]  [address] [address]-
8986  06 Oct, 2015  Chronic kidney disease N18.9

 

 [facility]  [address] [place] [address] 96929-
2404  05 Oct, 2015  Chronic kidney disease N18.9 and Back pain M54.9

 

 [facility]  [address] [place] [address] [phone]-
7602  24 Sep, 2015  Abdominal pain 789.00

 

 [facility]  [id number] [place] [address] [phone]-
[phone]  08 Sep, 2015  Other and unspecified hyperlipidemia 272.4

 

 [facility]  [id number] [place] [address] 822843-
3153  03 Sep, 2015  Diabetes mellitus without mention of complication, type II 
or unspecified type, not stated as uncontrolled 250.00

 

 [facility]  [id number] [place] [address] [phone]-
5660  28 Aug, 2015  Diabetes mellitus without mention of complication, type II 
or unspecified type, not stated as uncontrolled 250.00

 

 [facility]  [id number] [place] [address] 43548-
4733    Diabetes mellitus without mention of complication, type II 
or unspecified type, uncontrolled 250.02 ; Diabetes mellitus without mention of 
complication, type II or unspecified type, not stated as uncontrolled 250.00 
and Acute sinusitis 461.9

 

 [facility]  [id number] [place] [address] 97077-
9506     

 

 [facility]  [address] [place] [address] 29427-
2820     

 

 [facility]  [id number] [place] [address] 613810-
7182     

 

 [facility]  [address] [place] [address] 25158-
1126     

 

 [facility]  [id number] [place] [address] 55354-
5425  12 Dec, 2014   

 

 [facility]  [id number] [place] [address] [phone]-
[phone]  12 Dec, 2014   

 

 [facility]  [id number] [place] [address] [phone]-
8475  08 Dec, 2014   

 

 [facility] [place] [facility]  [address] [id number] [place] [address]-
5877  08 Dec, 2014   

 

 [facility] [place] [facility]  [address] [id number] [place] [address]-
7336  08 Dec, 2014   

 

 [facility] [place] [facility]  [address] [id number] [place] [address]-
8091  08 Dec, 2014   

 

 [facility] [place] [facility]  [address] [id number] [place] [address]-
4180     

 

 [facility] [place] [facility]  [address] [id number] [place] [address]-
6453     

 

 [facility] [place] [facility]  [address] [id number] [place] [address]-
4062  10 Oct, 2014   

 

 [facility] [place] [facility]  [address] [id number] [place] [address]-
6233  10 Oct, 2014   

 

 [facility] [place] [facility]  [address] [id number] [place] [address]-
0950  07 Oct, 2014   

 

 [facility] [place] [facility]  [address] [id number] [place] [address]-
0944  07 Oct, 2014   

 

 [facility] [place] [facility]  [address] [id number] [place] [address]-
1112  04 Aug, 2014   

 

 [facility] [place] [facility]  [address] [id number] [place] [address]-
0679  04 Aug, 2014   

 

 [facility] [place] [facility]  [address] [id number] [address]-
8306  05 May, 2014   

 

 [facility] [place] [facility]  [address] [id number] [place] [address]-
0438  05 May, 2014   

 

 [facility] [place] [facility]  [address] [id number] [place] [address]-
4373     

 

 [facility] [place] [facility]  [address] [id number] [place] [address]-
8853     

 

 [facility] [place] [facility]  [address] [id number] [place] [address]-
2963     

 

 [facility] [place] [facility]  [address] [id number] [place] [address]-
2746     

 

 [facility] [place] [facility]  [address] [id number] [place]-
3632     

 

 [facility] [place] [facility]  [address] [id number] [place] [address]-
6967     

 

 [facility] [place] [facility]  [address] [id number] [place] [address]-
6041     

 

 [facility] [place] [facility]  [address] [id number] [place] [address]-
5321     

 

 [facility] [place] [facility]  [address] [id number] [place] [address]-
9522     

 

 [facility] [place] [facility]  [address] [id number] [place] [address]-
6487     

 

 [facility] [place] [facility]  [address] [id number] [place] [address]-
8872  31 Mar, 2014   

 

 [facility] [place] [facility]  [address] [id number] [place] [address]-
8422  31 Mar, 2014   

 

 [facility] [place] [facility]  [address] [id number] [place] [address]-
1355  05 Mar, 2014   

 

 [facility] [place] [facility]  [address] [id number] [place] [address]-
1609  05 Mar, 2014   

 

 [facility] [place] [facility]  [address] [id number] [place] [address]-
7367     

 

 [facility] [place] [facility]  [address] [id number] [place] [address]-
0875     

 

 [facility] [place] [facility]  [address] [id number] [place] [address]-
5061     

 

 [facility] [place] [facility]  [address] [id number] [place] [address]-
1152     

 

 [facility] [place] [facility]  [address] [id number] [place] [address]-
4547     

 

 [facility]  [address] [id number] [place] [address]-
7748     

 

 [facility] [place] [facility]  [address] [id number] [place] [address]-
0328     

 

 [facility] [place] [facility]  [address] [id number] [place] [address]-
1735     

 

 [facility] [place] [facility]  [address] [id number] [place] [address]-
9264  13 Aug, 2013   

 

 [facility] [place] [facility]  [address] [id number] [place] [address]-
1117     

 

 [facility] [place] [facility]  [address] [id number] [place] [address]-
3179     

 

 [facility] [place] [facility]  [address] [id number] [place] [address]-
5244  15 Jul, 2013   

 

 [facility] [place] [facility]  [address] [id number] [place] [address]-
1603     

 

 [facility] [place] [facility]  [address] [id number] [place] [address]-
1780     

 

 [facility] [place] [facility]  [address] [id number] [place] [address]-
5898  07 Mar, 2013   

 

 [facility] [place] [facility]  [address] [id number] [place] [address]-
5529     

 

 [facility] [place] [facility]  [address] [id number] [place] [address]-
4578     

 

 [facility] [place] [facility]  [address] [id number] [place] [address]-
0563     

 

 [facility] [place] [facility]  [address] [id number] [place] [address]-
6504  15 Khurram, 2013   

 

 [facility] [place] [facility]  [address] [id number] [address]-
4730     

 

 [facility]  [address] [address]-
3944     

 

 [facility]  [address] [address]-
8386     

 

 [facility]  [address] [address]-
3902     

 

 [facility]  [address] [address]-
4753  13 Sep, 2012   

 

 [facility]  [address] [address]-
8046  22 May, 2012   

 

 [facility]  [address] [address]-
3982     

 

 [facility]  [address] [address]-
4262     

 

 [facility]  [address] [address]-
6439     

 

 [facility]  [address] [address]-
2700     

 

 [facility]  [address] [address]-
7060     

 

 [facility]  [address] [address]-
8288     







IMMUNIZATIONS

No Known Immunizations



SOCIAL HISTORY

Never Assessed



REASON FOR VISIT

COPD-Darryl SKAGGS



PLAN OF CARE







 Activity  Details









  









 Follow Up  3 Months Reason:







VITAL SIGNS







 Height  70 in  2018

 

 Weight  208.6 lbs  2018

 

 Temperature  98.4 degrees Fahrenheit  2018

 

 Heart Rate  79 bpm  2018

 

 Respiratory Rate  2018

 

 Oximetry  on room air:98 %  2018

 

 BMI  29.93 kg/m2  2018

 

 Blood pressure systolic  128 mmHg  2018

 

 Blood pressure diastolic  82 mmHg  2018







MEDICATIONS







 Medication  Instructions  Dosage  Frequency  Start Date  End Date  Duration  
Status

 

 Hydrochlorothiazide 25MG  Orally Once a day  1 tablet  24h           Active

 

 Finasteride 5MG  Orally Once a day  1 tablet  24h           Active

 

 Metformin HCl 1000MG  Orally 2 times a day  1 tablet  12h        90 days  
Active

 

 Lipitor 10MG  Orally Once a day  1 tablet  24h           Active

 

 ProAir HFA 90 mcg/actuation     inhale 2 puffs by Inhalation route every 4 
hours as needed  PRN shortness of breath/cough     05 May, 2014        Not-
Taking

 

 Ranitidine Acid Reducer 75 MG  Orally Twice a day  1 tablet as needed  12h    
       Not-Taking

 

 GlipiZIDE 5MG     TAKE ONE TABLET BY MOUTH ONCE DAILY              Active

 

 Zantac 150 MG  Orally Twice a day  1 tablet  12h  24 Sep, 2015        Not-
Taking

 

 Trulicity 1.5 MG/0.5ML  Subcutaneous once weekly  0.5 ml     27 Sep, 2016  14 
Mar, 2019  90 days  Active

 

 tramadol 50 mg     take 1 tablet by Oral route every 6 hours as needed  PRN 
pain     08 Dec, 2014        Not-Taking

 

 Lisinopril 20MG     TAKE ONE TABLET BY MOUTH ONCE DAILY           60  Active

 

 Flexeril 10 mg     1 tablet by Oral route PRN hs     08 Dec, 2014        Not-
Taking

 

 Jardiance 25MG  Orally Once a day  1 tablet  24h           Not-Taking







RESULTS

No Results



PROCEDURES







 Procedure  Date Ordered  Result  Body Site

 

 GLYCATED HEMOGLOBIN TEST  2018      

 

 MICROALBUMIN, SEMIQUANT  2018      

 

 VENIPUNCT, ROUTINE*  2018      

 

 [facility]      

 

 [facility] VISIT ESTABLISHED PATIENT  2018      







INSTRUCTIONS





MEDICATIONS ADMINISTERED

No Known Medications



MEDICAL (GENERAL) HISTORY







 Type  Description  Date

 

 Medical History  Type II diabetic   

 

 Medical History  hypertension   

 

 Surgical History  colon surgery   

 

 Surgical History  scar tissue removal   

 

 Surgical History  bilateral cataract surgeries   

 

 Hospitalization History  surgery   

 

 Hospitalization History  [facility] - Back pain

## 2018-08-14 NOTE — XMS REPORT
[facility]

 Created on: 2018



Eddi Sesay

External Reference #: [id number]

: 1945

Sex: Male



Demographics







 Address  [address] [place]  [address]

 

 Preferred Language  Unknown

 

 Marital Status  Unknown

 

 Christian Affiliation  Unknown

 

 Race  Unknown

 

 Ethnic Group  Unknown





Author







 Author  MALDONADO TOMPKINS

 

 [facility]

 

 Address  [address] [place]  [address]



 

 Phone  [phone]







Care Team Providers







 Care Team Member Name  Role  Phone

 

 MALDONADO TOMPKINS  Unavailable  [phone]







PROBLEMS







 Type  Condition  ICD9-CM Code  AXS87-IP Code  Onset Dates  Condition Status  
SNOMED Code

 

 Problem  Chronic kidney disease     N18.9     Active  722338090

 

 Problem  COPD (chronic obstructive pulmonary disease)     J44.9     Active  
16580970

 

 Problem  Hyperlipidemia     E78.5     Active  37395303

 

 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 
lower urinary tract symptoms [LUTS]  600.01        Active  830815546

 

 Problem  Other chronic pain     G89.29     Active  76788859

 

 Problem  Low back pain     M54.5     Active  434625748

 

 Problem  BPH NOS w ur obs/LUTS     N40.1     Active  974010135

 

 Problem  Hypertension     I10     Active  38733579

 

 Problem  Type 2 diabetes mellitus with diabetic chronic kidney disease     
E11.22     Active  54195314

 

 Problem  Chronic kidney disease, stage II (mild)     N18.2     Active  
403022780







ALLERGIES

No Information



ENCOUNTERS







 Encounter  Location  Date  Diagnosis

 

 [place] [id number] [place] [address] 47044-
4916    Colonic mass K63.9

 

 [place] [id number] [place] [address] 21847-
4016     

 

 [place] [id number] [place] [address] 98373-
3628  10 Jul, 2018  Chronic kidney disease, stage II (mild) N18.2

 

 [place] [id number] [place] [address] 26277-
2895    Low back pain M54.5 ; Other chronic pain G89.29 and 
Retroperitoneal lymphadenopathy R59.0

 

 [facility]  [id number] [place] [address] 92495-
2462  16 May, 2018  Medicare annual wellness visit, initial Z00.00 ; 
Hyperlipidemia E78.5 ; Type 2 diabetes mellitus with diabetic chronic kidney 
disease E11.22 ; COPD (chronic obstructive pulmonary disease) J44.9 ; Chronic 
kidney disease, stage II (mild) N18.2 ; Hypertension I10 ; BPH NOS w ur obs/
LUTS N40.1 and History of smoking Z87.891

 

 [facility]  [id number] [place] [address] 46098-
8122  14 May, 2018  Medicare annual wellness visit, initial Z00.00 ; Type 2 
diabetes mellitus with diabetic chronic kidney disease E11.22 ; COPD (chronic 
obstructive pulmonary disease) J44.9 ; Chronic kidney disease, stage II (mild) 
N18.2 ; Hypertension I10 ; Hyperlipidemia E78.5 ; BPH NOS w ur obs/LUTS N40.1 
and History of smoking Z87.891

 

 [place] [id number] [place] [address] 07072-
3939  20 Mar, 2018  BPH NOS w ur obs/LUTS N40.1 ; COPD (chronic obstructive 
pulmonary disease) J44.9 ; Hyperlipidemia E78.5 ; Hypertension I10 ; Type 2 
diabetes mellitus with diabetic chronic kidney disease E11.22 and Chronic 
kidney disease, stage II (mild) N18.2

 

 [place] [id number] [place] [address] 79576-
9592  19 Mar, 2018  Diabetes E11.9

 

 [place] [id number] [place] [address] 41317-
1186  15 Aug, 2017  COPD (chronic obstructive pulmonary disease) J44.9

 

 [facility]  [id number] [place] [address] 33138-
2554  01 Aug, 2017  Hyperlipidemia E78.5

 

 [place] [id number] [place] [address] 22874-
4621     

 

 [place] [id number] [place] [address] 85844-
5633     

 

 [place] [id number] [place] [address] 73965-
6175    Diabetes E11.9

 

 [facility]  [address] [place] [address] 48582-
3365    Diabetes E11.9

 

 [facility]  [id number] [place] [address] 19294-
2616    Hypertension I10 ; Diabetes E11.9 and COPD (chronic 
obstructive pulmonary disease) J44.9

 

 [facility]  [id number] [place] [address] 51623-
7297    Diabetes E11.9 and Hypertension I10

 

 [facility]  [id number] [place] [address] 93979-
7984  13 Oct, 2016   

 

 [facility]  [id number] [place] [address] 39582-
3838  28 Sep, 2016  Hyperlipidemia E78.5

 

 [facility]  [id number] [place] [address] 23000-
9249  27 Sep, 2016  Diabetes E11.9 and Hyperlipidemia E78.5

 

 [facility]  [id number] [place] [address] 00899-
8937    Chronic kidney disease N18.9

 

 [facility]  [id number] [place] [address] 75128-
4000    Diabetes E11.9 ; Hypertension I10 ; Hyperlipidemia E78.5 
and COPD (chronic obstructive pulmonary disease) J44.9

 

 [facility]  [id number] [place] [address] 34883-
3744  06 Oct, 2015   

 

 [facility]  [id number] [place] [address] 63519-
9899  06 Oct, 2015  Altered metabolism due to diabetes E11.9

 

 [facility]  [id number] [place] [address] 59276-
1597  06 Oct, 2015  Chronic kidney disease N18.9

 

 [facility]  [id number] [place] [address] 03529-
9875  05 Oct, 2015  Chronic kidney disease N18.9 and Back pain M54.9

 

 [facility]  [address], [place] [address]-
8976  24 Sep, 2015  Abdominal pain 789.00

 

 [facility]  [id number] [place] [address] 73103-
0250  08 Sep, 2015  Other and unspecified hyperlipidemia 272.4

 

 [facility]  [id number] [place] [address] 416108-
6896  03 Sep, 2015  Diabetes mellitus without mention of complication, type II 
or unspecified type, not stated as uncontrolled 250.00

 

 [facility]  [address] [address]-
4250  28 Aug, 2015  Diabetes mellitus without mention of complication, type II 
or unspecified type, not stated as uncontrolled 250.00

 

 [facility]  [address] [place] [address] 83233-
9450    Diabetes mellitus without mention of complication, type II 
or unspecified type, uncontrolled 250.02 ; Diabetes mellitus without mention of 
complication, type II or unspecified type, not stated as uncontrolled 250.00 
and Acute sinusitis 461.9

 

 [facility]  [address] [address]-
4755     

 

 [facility]  [address] [address]-
9303     

 

 [facility]  [address] [address]-
3629     

 

 [facility]  [address] [address]-
8288     

 

 [facility]  [address] [address]1374-
5382  12 Dec, 2014   

 

 [facility]  [address] 823420-
4115  12 Dec, 2014   

 

 [facility]  [address] 160298-
4103  08 Dec, 2014   

 

 [facility]  [address] [address]-
3615  08 Dec, 2014   

 

 [facility] [place] [facility]  [address] [id number] [place] [address]-
7856  08 Dec, 2014   

 

 [facility] [place] [facility]  [address] [id number] [place] [address]-
2376  08 Dec, 2014   

 

 [facility] [place] [facility]  [address] [id number] [place] [address]-
9866     

 

 [facility] [place] [facility]  [address] [id number] [place] [address]-
0150     

 

 [facility] [place] [facility]  [address] [id number] [place] [address]-
6432  10 Oct, 2014   

 

 [facility] [place] [facility]  [address] [id number] [place] [address]-
8438  10 Oct, 2014   

 

 [facility] [place] [facility]  [address] [id number] [place] [address]-
7446  07 Oct, 2014   

 

 [facility] [place] [facility]  [address] [id number] [place] [address]-
5739  07 Oct, 2014   

 

 [facility] [place] [facility]  [address] [id number] [place] [address]-
6936  04 Aug, 2014   

 

 [facility] [place] [facility]  [address] [id number] [place] [address]-
7587  04 Aug, 2014   

 

 [facility] [place] [facility]  [address] [id number] [place] [address]-
4876  05 May, 2014   

 

 [facility] [place] [facility]  [address] [id number] [place] [address]-
8876  05 May, 2014   

 

 [facility] [place] [facility]  [address] [id number] [place] [address]-
7771     

 

 [facility] [place] [facility]  [address] [id number] [place] [address]-
2376     

 

 [facility] [place] [facility]  [address] [id number] [place] [address]-
9184     

 

 [facility] [place] [facility]  [address] [id number] [place] [address]-
4671     

 

 [facility] [place] [facility]  [address] [id number] [place]-
3591     

 

 [facility] [place] [facility]  [address] [id number] [place]-
4861     

 

 [facility] [place] [facility]  [address] [id number] [place] [address]-
4745     

 

 [facility] [place] [facility]  [address] [id number] [place]-
2918     

 

 [facility] [place] [facility]  [address] [id number] [place] [address]-
7085     

 

 [facility] [place] [facility]  [address] [id number] [place] [address]-
8946     

 

 [facility] [place] [facility]  [address] [id number] [place] [address]-
4866  31 Mar, 2014   

 

 [facility] [place] [facility]  [address] [id number] [place] [address]-
6768  31 Mar, 2014   

 

 [facility] [place] [facility]  [address] [id number] [place] [address]-
9514  05 Mar, 2014   

 

 [facility] [place] [facility]  [address] [id number] [place] [address]-
9498  05 Mar, 2014   

 

 [facility] [place] [facility]  [address] [id number] [place] [address]-
1918     

 

 [facility] [place] [facility]  [address] [id number] [place] [address]-
1815     

 

 [facility] [place] [facility]  [address] [id number] [place] [address]-
3207     

 

 [facility] [place] [facility]  [address] [id number] [place] [address]-
7593     

 

 [facility] [place] [facility]  [address] [id number] [place] [address]-
0082     

 

 [facility]  [address] [id number] [place] [address]-
2975     

 

 [facility] [place] [facility]  [address] [id number] [place] [address]-
5813     

 

 [facility] [place] [facility]  [address] [id number] [place] [address]-
6309     

 

 [facility] [place] [facility]  [address] [id number] [place] [address]-
1520  13 Aug, 2013   

 

 [facility] [place] [facility]  [address] [id number] [place] [address]-
3392     

 

 [facility] [place] [facility]  [address] [id number] [place] [address]-
9705     

 

 [facility]  [address] [id number] [place] [address]-
7654  15 Jul, 2013   

 

 [facility] [place] [facility]  [address] [id number] [place] [address]-
9557     

 

 [facility] [place] [facility]  [address] [id number] [place] [address]-
9861     

 

 [facility] [place] [facility]  [address] [id number] [place] [address]-
9865  07 Mar, 2013   

 

 [facility] [place] [facility]  [address] [id number] [place] [address]-
5846     

 

 [facility] [place] [facility]  [address] [id number] [place] [address]-
5431     

 

 [facility] [place] [facility]  [address] [id number] [place] [address]-
7801     

 

 [facility] [place] [facility]  [address] [id number] [place] [address]-
3226  15 Khurram, 2013   

 

 [facility] [place] [facility]  [address] [id number] [place] [address]-
1822  16 2012   

 

 [facility]  [address] [facility] [id number] [address]
2546  16 2012   

 

 [facility]  [address] [facility] [id number] [address]-
3036     

 

 [facility]  [address] [facility] [id number] [address]-
3856     

 

 [facility]  [address], [place] [address]-
9704  13 Sep, 2012   

 

 [facility]  [address], [place] [address]-
7877  22 May, 2012   

 

 [facility]  [address], [place] [address]-
2096     

 

 [facility]  [address], [place] [address]-
9712     

 

 [facility]  [address], [place] [address]-
0270     

 

 [facility]  [address], [place] [address]-
3017     

 

 [facility]  [address], [place] [address]-
2244     

 

 [facility]  [id number] [address]-
8501     







IMMUNIZATIONS

No Known Immunizations



SOCIAL HISTORY

Never Assessed



REASON FOR VISIT

Lab (walk-in)



PLAN OF CARE





VITAL SIGNS





MEDICATIONS

Unknown Medications



RESULTS

No Results



PROCEDURES







 Procedure  Date Ordered  Result  Body Site

 

 LAB NOT BILLED BY [facility]  May 16, 2018      







INSTRUCTIONS





MEDICATIONS ADMINISTERED

No Known Medications



MEDICAL (GENERAL) HISTORY







 Type  Description  Date

 

 Medical History  Type II diabetic   

 

 Medical History  hypertension   

 

 Surgical History  colon surgery   

 

 Surgical History  scar tissue removal   

 

 Surgical History  bilateral cataract surgeries   

 

 Hospitalization History  surgery   

 

 Hospitalization History  [facility] - Back pain

## 2018-09-17 ENCOUNTER — HOSPITAL ENCOUNTER (OUTPATIENT)
Dept: HOSPITAL 75 - RAD | Age: 73
End: 2018-09-17
Attending: NURSE PRACTITIONER
Payer: MEDICARE

## 2018-09-17 DIAGNOSIS — R59.0: ICD-10-CM

## 2018-09-17 DIAGNOSIS — J44.9: Primary | ICD-10-CM

## 2018-09-17 PROCEDURE — 71275 CT ANGIOGRAPHY CHEST: CPT

## 2018-09-17 NOTE — DIAGNOSTIC IMAGING REPORT
PROCEDURE: CT angiography of the chest with contrast.



TECHNIQUE: Multiple contiguous axial images were obtained through

the chest after uneventful bolus administration of intravenous

contrast. Reconstructed CTA MIP acquisitions were also performed.

 

INDICATION: COPD, dyspnea. 



COMPARISON: I have no previous for direct comparison. Study

correlated with overlapped images of the lower chest obtained

during abdominal CT 07/18/2018.



FINDINGS:  

There is no intraluminal pulmonary arterial filling defects.

There is benign calcified granulomatous disease in the subcarinal

compartment. There is centrilobular emphysema greatest in the

pulmonary apices. There are a few subpleural micronodules, likely

areas of scarring, no dominant or suspect lung mass. There is no

thoracic lymphadenopathy and there is no evidence for pneumonia.

There is no thoracic effusion or pneumothorax. No acute chest

wall pathology.



The upper abdomen reveals gallstones with postoperative changes

to the midline ventral abdominal wall as well as aortocaval and

portocaval right upper quadrant lymphadenopathy not significantly

changed from a previous abdominal CT. 



IMPRESSION:

Negative for PE or acute chest pathology. Benign granulomatous

residua with no suspicious chest mass. Pathological abdominal

lymphadenopathy, partially visualized, unchanged from a previous

abdominal CT.



Dictated by: 



  Dictated on workstation # [id number]

## 2018-09-19 ENCOUNTER — HOSPITAL ENCOUNTER (OUTPATIENT)
Dept: HOSPITAL 75 - ONC | Age: 73
LOS: 11 days | Discharge: HOME | End: 2018-09-30
Attending: INTERNAL MEDICINE
Payer: MEDICARE

## 2018-09-19 DIAGNOSIS — Z79.899: ICD-10-CM

## 2018-09-19 DIAGNOSIS — Z79.84: ICD-10-CM

## 2018-09-19 DIAGNOSIS — Z87.891: ICD-10-CM

## 2018-09-19 DIAGNOSIS — C18.2: Primary | ICD-10-CM

## 2018-09-19 DIAGNOSIS — J44.9: ICD-10-CM

## 2018-09-19 DIAGNOSIS — E11.9: ICD-10-CM

## 2018-09-19 PROCEDURE — 99214 OFFICE O/P EST MOD 30 MIN: CPT

## 2018-09-24 ENCOUNTER — HOSPITAL ENCOUNTER (OUTPATIENT)
Dept: HOSPITAL 75 - SLEEP | Age: 73
LOS: 1 days | Discharge: HOME | End: 2018-09-25
Attending: NURSE PRACTITIONER
Payer: MEDICARE

## 2018-09-24 DIAGNOSIS — G47.50: ICD-10-CM

## 2018-09-24 DIAGNOSIS — G47.10: Primary | ICD-10-CM

## 2018-09-24 PROCEDURE — 95810 POLYSOM 6/> YRS 4/> PARAM: CPT

## 2018-09-25 ENCOUNTER — HOSPITAL ENCOUNTER (OUTPATIENT)
Dept: HOSPITAL 75 - RAD | Age: 73
End: 2018-09-25
Attending: INTERNAL MEDICINE
Payer: MEDICARE

## 2018-09-25 DIAGNOSIS — C18.9: Primary | ICD-10-CM

## 2018-09-25 DIAGNOSIS — Z90.49: ICD-10-CM

## 2018-09-25 DIAGNOSIS — K76.89: ICD-10-CM

## 2018-09-25 DIAGNOSIS — R59.0: ICD-10-CM

## 2018-09-26 ENCOUNTER — HOSPITAL ENCOUNTER (OUTPATIENT)
Dept: HOSPITAL 75 - RT | Age: 73
End: 2018-09-26
Attending: NURSE PRACTITIONER
Payer: MEDICARE

## 2018-09-26 DIAGNOSIS — R06.00: ICD-10-CM

## 2018-09-26 DIAGNOSIS — Z87.891: ICD-10-CM

## 2018-09-26 DIAGNOSIS — J44.9: Primary | ICD-10-CM

## 2018-09-26 PROCEDURE — 94726 PLETHYSMOGRAPHY LUNG VOLUMES: CPT

## 2018-09-26 PROCEDURE — 94729 DIFFUSING CAPACITY: CPT

## 2018-09-26 PROCEDURE — 94060 EVALUATION OF WHEEZING: CPT

## 2018-09-27 NOTE — DIAGNOSTIC IMAGING REPORT
PET/CT 



INDICATION: Colon cancer



TECHNIQUE: PET/CT imaging was obtained from the base of the skull

through the pelvis after the administration of 13.14 mCi of F-18

fluorodeoxyglucose. Limited CT imaging was utilized for

localization and attenuation correction purposes. The low energy

CT utilized for attenuation correction is not considered to be of

high enough spatial resolution to allow in and of itself a

separate anatomical analysis.



There are no previous PET/CT examinations available for

comparison. By history, the patient has a diagnosis of colon

carcinoma and did undergo a colon resection in August of 2018.

The CT abdomen/pelvis exam of 07/18/2018 did note a 19 mm

low-density mass in the right lobe of the liver. There is also

portacaval and central retroperitoneal lymphadenopathy. On this

exam there is a poorly defined area of hypermetabolic activity in

the right lobe of the liver. I am not certain that this

corresponds to the suspected area of low density seen on the CT

exam but this finding should be considered neoplastic until

proven otherwise. This has a maximum SUV of 5.5. There are 2

other much smaller areas of increased metabolic activity. These

also have SUV values in the 4-5 range. There is a similar sized

more hyperintense area of abnormal uptake in the left lobe of the

liver. This has a maximum SUV of 6.7 and should be considered

neoplastic as well. The nodes in the portacaval region seen on

the previous exam are also hypermetabolic. The maximum SUV in

this area is 7.1. The retroperitoneal adenopathy adjacent to the

aorta and inferior vena cava seen previously is only slightly

hypermetabolic however. The maximum SUV in this area is 3.9.



No other hypermetabolic activity seen to suggest the presence of

neoplasm. There is a fair amount of physiologic activity

throughout the large and small bowel and there is excretion of

the radiotracer by both kidneys. The CT images fail to show any

sign acute MI. As on the previous exam there is cholelithiasis

but there is no for acute cholecystitis. There is distortion of

the mesenteric fat about the region of the surgical resection.

Most likely is a sequela of the patient's prior surgical

procedure.



IMPRESSION:

1. There is hypermetabolic activity within the liver and in the

portacaval nodes seen previously. These nodes should be

considered neoplastic until proven otherwise.

2. The retroperitoneal adenopathy is not nearly as hyperintense

as the abnormalities involving the liver and the portacaval

region. Even so, these nodes are most likely involved by neoplasm

as well.

3. There is no other neoplastic activity evident.

4. There are postsurgical changes involving the mid abdomen

consistent the patient's recent colon resection history. There is

no acute abnormality evident. 



Dictated by: 



  Dictated on workstation # [id number]